# Patient Record
Sex: MALE | Race: WHITE
[De-identification: names, ages, dates, MRNs, and addresses within clinical notes are randomized per-mention and may not be internally consistent; named-entity substitution may affect disease eponyms.]

---

## 2019-12-19 ENCOUNTER — HOSPITAL ENCOUNTER (EMERGENCY)
Dept: HOSPITAL 76 - ED | Age: 84
Discharge: HOME | End: 2019-12-19
Payer: MEDICARE

## 2019-12-19 VITALS — DIASTOLIC BLOOD PRESSURE: 67 MMHG | SYSTOLIC BLOOD PRESSURE: 146 MMHG

## 2019-12-19 DIAGNOSIS — Z85.46: ICD-10-CM

## 2019-12-19 DIAGNOSIS — N30.90: Primary | ICD-10-CM

## 2019-12-19 LAB
CLARITY UR REFRACT.AUTO: (no result)
GLUCOSE UR QL STRIP.AUTO: NEGATIVE MG/DL
KETONES UR QL STRIP.AUTO: (no result) MG/DL
NITRITE UR QL STRIP.AUTO: NEGATIVE
PH UR STRIP.AUTO: 6 PH (ref 5–7.5)
PROT UR STRIP.AUTO-MCNC: 100 MG/DL
RBC # UR STRIP.AUTO: (no result) /UL
RBC # URNS HPF: (no result) /HPF (ref 0–5)
SP GR UR STRIP.AUTO: 1.02 (ref 1–1.03)
SQUAMOUS URNS QL MICRO: (no result)
UROBILINOGEN UR QL STRIP.AUTO: (no result) E.U./DL
UROBILINOGEN UR STRIP.AUTO-MCNC: NEGATIVE MG/DL

## 2019-12-19 PROCEDURE — 87077 CULTURE AEROBIC IDENTIFY: CPT

## 2019-12-19 PROCEDURE — 81003 URINALYSIS AUTO W/O SCOPE: CPT

## 2019-12-19 PROCEDURE — 81001 URINALYSIS AUTO W/SCOPE: CPT

## 2019-12-19 PROCEDURE — 87086 URINE CULTURE/COLONY COUNT: CPT

## 2019-12-19 PROCEDURE — 87181 SC STD AGAR DILUTION PER AGT: CPT

## 2019-12-19 PROCEDURE — 99283 EMERGENCY DEPT VISIT LOW MDM: CPT

## 2019-12-19 NOTE — ED PHYSICIAN DOCUMENTATION
PD HPI NVD





- Stated complaint


Stated Complaint: FEVER





- Chief complaint


Chief Complaint: Fever





- History obtained from


History obtained from: Patient (This is a relatively healthy 88-year-old 

gentleman with remote history of prostate cancer, he had some sort of bladder 

abnormality that was assessed by cystoscopy on October 29 and it was found that 

he had probably bladder cancer around an old radiation seed from his prostate 

cancer.  He has a repeat cystoscopy scheduled for next month, but for the last 3

to 4 weeks he has been having some intermittent low back pain along with 

purulent-looking urine and urinary incontinence and chills and weakness.)





Review of Systems


Constitutional: reports: Chills.  denies: Fever


Nose: denies: Rhinorrhea / runny nose, Congestion


Cardiac: denies: Chest pain / pressure, Palpitations


Respiratory: denies: Dyspnea, Cough





PD PAST MEDICAL HISTORY





- Past Medical History


GI: GERD


: Other





- Past Surgical History


Past Surgical History: Yes





- Present Medications


Home Medications: 


                                Ambulatory Orders











 Medication  Instructions  Recorded  Confirmed


 


Hydrochlorothiazide 1 tab PO DAILY 03/04/18 


 


Omeprazole 1 cap PO DAILY 03/04/18 


 


Ciprofloxacin HCl [Cipro] 500 mg PO BID #20 tablet 12/19/19 














- Allergies


Allergies/Adverse Reactions: 


                                    Allergies











Allergy/AdvReac Type Severity Reaction Status Date / Time


 


No Known Drug Allergies Allergy   Verified 12/19/19 13:29














- Social History


Does the pt smoke?: No


Smoking Status: Never smoker


Does the pt drink ETOH?: No


Does the pt have substance abuse?: No





- Immunizations


Immunizations are current?: Yes





- POLST


Patient has POLST: No





PD ED PE NORMAL





- Vitals


Vital signs reviewed: Yes





- General


General: Alert and oriented X 3, No acute distress





- Abdomen


Abdomen: Normal bowel sounds, Soft, Non tender





- Back


Back: No CVA TTP, No spinal TTP





- Neuro


Neuro: Alert and oriented X 3, Normal speech





Results





- Vitals


Vitals: 


                               Vital Signs - 24 hr











  12/19/19





  13:29


 


Temperature 37.1 C


 


Heart Rate 73


 


Respiratory 16





Rate 


 


Blood Pressure 136/68 H


 


O2 Saturation 97








                                     Oxygen











O2 Source                      Room air

















- Labs


Labs: 


                                Laboratory Tests











  12/19/19





  13:43


 


Urine Color  YELLOW


 


Urine Clarity  SL. CLOUDY


 


Urine pH  6.0


 


Ur Specific Gravity  1.025


 


Urine Protein  100 H


 


Urine Glucose (UA)  NEGATIVE


 


Urine Ketones  TRACE


 


Urine Occult Blood  SMALL H


 


Urine Nitrite  NEGATIVE


 


Urine Bilirubin  NEGATIVE


 


Urine Urobilinogen  1 (NORMAL)


 


Ur Leukocyte Esterase  MODERATE H


 


Urine RBC  0-5


 


Urine WBC  >25 H


 


Ur Squamous Epith Cells  RARE Squamous


 


Urine Bacteria  Moderate H


 


Ur Microscopic Review  INDICATED


 


Urine Culture Comments  INDICATED














PD MEDICAL DECISION MAKING





- ED course


ED course: 





88-year-old gentleman presents with fairly typical symptoms for a bladder 

infection and found to have same treated with Cipro.





Departure





- Departure


Disposition: 01 Home, Self Care


Clinical Impression: 


 Cystitis





Condition: Good


Record reviewed to determine appropriate education?: Yes


Instructions:  ED UTI Cystitis Male


Prescriptions: 


Ciprofloxacin HCl [Cipro] 500 mg PO BID #20 tablet


Comments: 


Follow-up with urologist as scheduled.  Return for new or worsening symptoms.





We will culture your urine, the results should be done in 48-72 hours.  If an 

antibiotic change is necessary we will call you.  Return if worse in the 

meantime, especially if you develop increasing flank pain, fevers, or cannot 

keep down the medication.

## 2019-12-25 ENCOUNTER — HOSPITAL ENCOUNTER (INPATIENT)
Dept: HOSPITAL 76 - ED | Age: 84
LOS: 8 days | Discharge: ANOTHER HOSPITAL | DRG: 683 | End: 2020-01-02
Attending: INTERNAL MEDICINE | Admitting: INTERNAL MEDICINE
Payer: MEDICARE

## 2019-12-25 DIAGNOSIS — M79.89: ICD-10-CM

## 2019-12-25 DIAGNOSIS — C67.9: ICD-10-CM

## 2019-12-25 DIAGNOSIS — R59.0: ICD-10-CM

## 2019-12-25 DIAGNOSIS — K76.1: ICD-10-CM

## 2019-12-25 DIAGNOSIS — B96.89: ICD-10-CM

## 2019-12-25 DIAGNOSIS — D12.2: ICD-10-CM

## 2019-12-25 DIAGNOSIS — R09.02: ICD-10-CM

## 2019-12-25 DIAGNOSIS — N39.0: ICD-10-CM

## 2019-12-25 DIAGNOSIS — R50.9: ICD-10-CM

## 2019-12-25 DIAGNOSIS — E87.70: ICD-10-CM

## 2019-12-25 DIAGNOSIS — R09.89: ICD-10-CM

## 2019-12-25 DIAGNOSIS — Z79.1: ICD-10-CM

## 2019-12-25 DIAGNOSIS — K21.9: ICD-10-CM

## 2019-12-25 DIAGNOSIS — R59.1: ICD-10-CM

## 2019-12-25 DIAGNOSIS — D64.9: Primary | ICD-10-CM

## 2019-12-25 DIAGNOSIS — R79.89: ICD-10-CM

## 2019-12-25 DIAGNOSIS — R39.11: ICD-10-CM

## 2019-12-25 DIAGNOSIS — R79.1: ICD-10-CM

## 2019-12-25 DIAGNOSIS — N17.9: ICD-10-CM

## 2019-12-25 DIAGNOSIS — E86.0: ICD-10-CM

## 2019-12-25 DIAGNOSIS — Z72.89: ICD-10-CM

## 2019-12-25 DIAGNOSIS — R19.5: ICD-10-CM

## 2019-12-25 DIAGNOSIS — K76.0: ICD-10-CM

## 2019-12-25 DIAGNOSIS — E87.6: ICD-10-CM

## 2019-12-25 DIAGNOSIS — Z85.46: ICD-10-CM

## 2019-12-25 DIAGNOSIS — K92.1: ICD-10-CM

## 2019-12-25 DIAGNOSIS — Z92.3: ICD-10-CM

## 2019-12-25 DIAGNOSIS — D12.3: ICD-10-CM

## 2019-12-25 DIAGNOSIS — K31.9: ICD-10-CM

## 2019-12-25 DIAGNOSIS — I10: ICD-10-CM

## 2019-12-25 DIAGNOSIS — D50.0: ICD-10-CM

## 2019-12-25 LAB
ALBUMIN DIAFP-MCNC: 2.2 G/DL (ref 3.2–5.5)
ALBUMIN/GLOB SERPL: 0.6 {RATIO} (ref 1–2.2)
ALP SERPL-CCNC: 71 IU/L (ref 42–121)
ALT SERPL W P-5'-P-CCNC: 41 IU/L (ref 10–60)
ANION GAP SERPL CALCULATED.4IONS-SCNC: 10 MMOL/L (ref 6–13)
AST SERPL W P-5'-P-CCNC: 52 IU/L (ref 10–42)
BASOPHILS NFR BLD AUTO: 0 10^3/UL (ref 0–0.1)
BASOPHILS NFR BLD AUTO: 0.4 %
BILIRUB BLD-MCNC: 0.4 MG/DL (ref 0.2–1)
BILIRUB UR QL CFM: POSITIVE
BUN SERPL-MCNC: 30 MG/DL (ref 6–20)
CALCIUM UR-MCNC: 8.4 MG/DL (ref 8.5–10.3)
CASTS URNS QL MICRO: (no result) /LPF
CHLORIDE SERPL-SCNC: 99 MMOL/L (ref 101–111)
CLARITY UR REFRACT.AUTO: (no result)
CO2 SERPL-SCNC: 29 MMOL/L (ref 21–32)
CREAT SERPLBLD-SCNC: 1.4 MG/DL (ref 0.6–1.2)
EOSINOPHIL # BLD AUTO: 0 10^3/UL (ref 0–0.7)
EOSINOPHIL NFR BLD AUTO: 0.1 %
ERYTHROCYTE [DISTWIDTH] IN BLOOD BY AUTOMATED COUNT: 12.5 % (ref 12–15)
GFRSERPLBLD MDRD-ARVRAT: 48 ML/MIN/{1.73_M2} (ref 89–?)
GLOBULIN SER-MCNC: 4 G/DL (ref 2.1–4.2)
GLUCOSE SERPL-MCNC: 134 MG/DL (ref 70–100)
GLUCOSE UR QL STRIP.AUTO: NEGATIVE MG/DL
HGB UR QL STRIP: 7.7 G/DL (ref 14–18)
KETONES UR QL STRIP.AUTO: NEGATIVE MG/DL
LIPASE SERPL-CCNC: 35 U/L (ref 22–51)
LYMPHOCYTES # SPEC AUTO: 0.7 10^3/UL (ref 1.5–3.5)
LYMPHOCYTES NFR BLD AUTO: 8.9 %
MCH RBC QN AUTO: 31.2 PG (ref 27–31)
MCHC RBC AUTO-ENTMCNC: 31.4 G/DL (ref 32–36)
MCV RBC AUTO: 99.2 FL (ref 80–94)
MONOCYTES # BLD AUTO: 0.7 10^3/UL (ref 0–1)
MONOCYTES NFR BLD AUTO: 9.4 %
NEUTROPHILS # BLD AUTO: 6 10^3/UL (ref 1.5–6.6)
NEUTROPHILS # SNV AUTO: 7.4 X10^3/UL (ref 4.8–10.8)
NEUTROPHILS NFR BLD AUTO: 80.8 %
NITRITE UR QL STRIP.AUTO: NEGATIVE
PDW BLD AUTO: 10.3 FL (ref 7.4–11.4)
PH UR STRIP.AUTO: 5 PH (ref 5–7.5)
PLATELET # BLD: 254 10^3/UL (ref 130–450)
PROT SPEC-MCNC: 6.2 G/DL (ref 6.7–8.2)
PROT UR STRIP.AUTO-MCNC: 100 MG/DL
RBC # UR STRIP.AUTO: (no result) /UL
RBC # URNS HPF: (no result) /HPF (ref 0–5)
RBC MAR: 2.47 10^6/UL (ref 4.7–6.1)
SODIUM SERPLBLD-SCNC: 138 MMOL/L (ref 135–145)
SP GR UR STRIP.AUTO: >=1.03 (ref 1–1.03)
SQUAMOUS URNS QL MICRO: (no result)
UROBILINOGEN UR QL STRIP.AUTO: (no result) E.U./DL
UROBILINOGEN UR STRIP.AUTO-MCNC: (no result) MG/DL

## 2019-12-25 PROCEDURE — 83605 ASSAY OF LACTIC ACID: CPT

## 2019-12-25 PROCEDURE — 93971 EXTREMITY STUDY: CPT

## 2019-12-25 PROCEDURE — 83880 ASSAY OF NATRIURETIC PEPTIDE: CPT

## 2019-12-25 PROCEDURE — 81003 URINALYSIS AUTO W/O SCOPE: CPT

## 2019-12-25 PROCEDURE — 80048 BASIC METABOLIC PNL TOTAL CA: CPT

## 2019-12-25 PROCEDURE — 85610 PROTHROMBIN TIME: CPT

## 2019-12-25 PROCEDURE — 97161 PT EVAL LOW COMPLEX 20 MIN: CPT

## 2019-12-25 PROCEDURE — 84466 ASSAY OF TRANSFERRIN: CPT

## 2019-12-25 PROCEDURE — 83690 ASSAY OF LIPASE: CPT

## 2019-12-25 PROCEDURE — 99284 EMERGENCY DEPT VISIT MOD MDM: CPT

## 2019-12-25 PROCEDURE — 82272 OCCULT BLD FECES 1-3 TESTS: CPT

## 2019-12-25 PROCEDURE — 80076 HEPATIC FUNCTION PANEL: CPT

## 2019-12-25 PROCEDURE — 83735 ASSAY OF MAGNESIUM: CPT

## 2019-12-25 PROCEDURE — 76770 US EXAM ABDO BACK WALL COMP: CPT

## 2019-12-25 PROCEDURE — 71046 X-RAY EXAM CHEST 2 VIEWS: CPT

## 2019-12-25 PROCEDURE — 99285 EMERGENCY DEPT VISIT HI MDM: CPT

## 2019-12-25 PROCEDURE — 82607 VITAMIN B-12: CPT

## 2019-12-25 PROCEDURE — 85018 HEMOGLOBIN: CPT

## 2019-12-25 PROCEDURE — 86920 COMPATIBILITY TEST SPIN: CPT

## 2019-12-25 PROCEDURE — 97166 OT EVAL MOD COMPLEX 45 MIN: CPT

## 2019-12-25 PROCEDURE — 97530 THERAPEUTIC ACTIVITIES: CPT

## 2019-12-25 PROCEDURE — 36415 COLL VENOUS BLD VENIPUNCTURE: CPT

## 2019-12-25 PROCEDURE — 71045 X-RAY EXAM CHEST 1 VIEW: CPT

## 2019-12-25 PROCEDURE — 96365 THER/PROPH/DIAG IV INF INIT: CPT

## 2019-12-25 PROCEDURE — 84132 ASSAY OF SERUM POTASSIUM: CPT

## 2019-12-25 PROCEDURE — 30233N1 TRANSFUSION OF NONAUTOLOGOUS RED BLOOD CELLS INTO PERIPHERAL VEIN, PERCUTANEOUS APPROACH: ICD-10-PCS | Performed by: INTERNAL MEDICINE

## 2019-12-25 PROCEDURE — 86901 BLOOD TYPING SEROLOGIC RH(D): CPT

## 2019-12-25 PROCEDURE — 87040 BLOOD CULTURE FOR BACTERIA: CPT

## 2019-12-25 PROCEDURE — 86900 BLOOD TYPING SEROLOGIC ABO: CPT

## 2019-12-25 PROCEDURE — 83540 ASSAY OF IRON: CPT

## 2019-12-25 PROCEDURE — 80053 COMPREHEN METABOLIC PANEL: CPT

## 2019-12-25 PROCEDURE — 51798 US URINE CAPACITY MEASURE: CPT

## 2019-12-25 PROCEDURE — 85025 COMPLETE CBC W/AUTO DIFF WBC: CPT

## 2019-12-25 PROCEDURE — 87276 INFLUENZA A AG IF: CPT

## 2019-12-25 PROCEDURE — 86850 RBC ANTIBODY SCREEN: CPT

## 2019-12-25 PROCEDURE — 82746 ASSAY OF FOLIC ACID SERUM: CPT

## 2019-12-25 PROCEDURE — 87275 INFLUENZA B AG IF: CPT

## 2019-12-25 PROCEDURE — 81001 URINALYSIS AUTO W/SCOPE: CPT

## 2019-12-25 PROCEDURE — 85014 HEMATOCRIT: CPT

## 2019-12-25 PROCEDURE — 87086 URINE CULTURE/COLONY COUNT: CPT

## 2019-12-25 PROCEDURE — 74177 CT ABD & PELVIS W/CONTRAST: CPT

## 2019-12-25 RX ADMIN — SODIUM CHLORIDE SCH MLS/HR: 9 INJECTION, SOLUTION INTRAVENOUS at 18:42

## 2019-12-25 RX ADMIN — SODIUM CHLORIDE, PRESERVATIVE FREE PRN ML: 5 INJECTION INTRAVENOUS at 18:48

## 2019-12-25 NOTE — HISTORY & PHYSICAL EXAMINATION
Chief Complaint





- Chief Complaint


Chief Complaint: not feeling well





History of Present Illness





- Admitted From


Admitted From:: [facility]





- History Obtained From


Records Reviewed: yes


History obtained from: patient and family





- History of Present Illness


HPI Comment/Other: 


Patient is an 87 y/o male who presented to the ED with complain of not feeling 

well for the past 3-4 weeks. He was initially seen in the ED in the beginning of

November for removal of a cruz catheter. He returned 1 week ago because he was 

still not feeling well. He was found to have a UTI and given a 5-day course of 

Cipro. He was also advised to return if there was no improvement thus leading to

his return. On further review it was found that 2 bacteria grew from the urine 

culture E. faecalis and Aerococcus urinae. Aerococcus was not sensitive to 

Cipro. He was also found to have a hemoglobin of 7.7. On October 29th 2019, it 

was 12.6. He denies dark tarry stools, coffee ground emesis or any traumatic 

event from which he would have bled profusely. He last had a colonoscopy 8-9 yrs

ago, during which a polyp was found. He denied chest pain, dyspnea, abd pain, 

n/v/d. He reports back spasm.


He has been diagnosed with bladder cancer and is schedule to follow up with [facility] for further treatment.








History





- Past Medical History


Cardiovascular: reports: Hypertension


Neuro: reports: None


GI: reports: GERD


: reports: Other (Prostate cancer, Bladder cancer)


Musculoskeletal: reports: Chronic back pain, Other


MRSA Hx?: No


Other Past Medical History: bladder ca





- Past Surgical History


General: reports: Colonoscopy, Other


Ortho: reports: Arthroscopic surgery, Other





- Family & Social History


Family History: Father: Cancer (lung and brain)


Living arrangement: At home


Social History Notes: He does not smoke or use illicit substances.  He has 1-2 

drinks daily





- POLST


Patient has POLST: No


POLST Status: Full Code





Meds/Allgy





- Home Medications


Home Medications: 


                                Ambulatory Orders











 Medication  Instructions  Recorded  Confirmed


 


Hydrochlorothiazide 1 tab PO DAILY 03/04/18 


 


Omeprazole 1 cap PO DAILY 03/04/18 


 


Ciprofloxacin HCl [Cipro] 500 mg PO BID #20 tablet 12/19/19 


 


Cefdinir 300 mg PO BID #20 capsule 12/25/19 














- Allergies


Allergies/Adverse Reactions: 


                                    Allergies











Allergy/AdvReac Type Severity Reaction Status Date / Time


 


No Known Drug Allergies Allergy   Verified 12/19/19 13:29














Review of Systems





- Constitutional


Constitutional: reports: Fatigue, Malaise.  denies: Fever, Chills





- Eyes


Eyes: denies: Pain, Blurred vision, Dipolpia





- Ears, Nose & Throat


Ears, Nose & Throat: denies: Vertigo, Sore throat





- Cardiovascular


Cariovascular: denies: Irregular heart rate, Palpitations, Chest pain, 

Lightheadedness, Syncope, Exertional dyspnea





- Respiratory


Respiratory: denies: Cough, Wheezing, SOB at rest, SOB with exertion





- Gastrointestinal


Gastrointestinal: reports: Reflux/heartburn.  denies: Abdominal pain, Abdominal 

distention, Constipation, Diarrhea, Black stools, Bloody stools, Nausea, 

Vomiting, Coffee grounds emesis





- Genitourinary


Genitourinary: denies: Dysuria, Frequency, Urgency, Hematuria





- Musculoskeletal


Musculoskeletal: reports: Back pain





- Integumentary


Integumentary: denies: Rash, Pruritis, Lesions





- Neurological


Neurological: denies: General weakness, Focal weakness, Headache





- Psychiatric


Psychiatric: denies: Depression, Anxiety





- Endocrine


Endocrine: denies: Polyuria, Polydypsia





- Hematologic/Lymphatic


Hematologic/Lymphatic: reports: Anemia.  denies: Bruising, Petechiae


Prior Level of Functionality: 


Patient is independent of activities of daily living








Exam





- Vital Signs


Vital Signs: 





                                Vital Signs x48h











  Temp Pulse Pulse Resp BP BP Pulse Ox


 


 12/25/19 18:04  37.3 C   81    154/62 H  90 L


 


 12/25/19 16:16  36.4 C L  66   14  130/74   97


 


 12/25/19 13:00   50 L   16  106/60   96














- Physical Exam


General Appearance: positive: No acute distress, Alert


Eyes Bilateral: positive: Normal inspection, PERRL, EOMI


ENT: positive: ENT inspection nml


Neck: positive: Nml inspection, No JVD, Trachea midline


Respiratory: positive: Chest non-tender, No respiratory distress, Breath sounds 

nml.  negative: Wheezes, Rales, Rhonchi


Cardiovascular: positive: Regular rate & rhythm, No murmur


Abdomen: positive: Non-tender, No organomegaly, Nml bowel sounds, No distention.

  negative: Guarding, Rebound


Rectal: positive: Stool - heme POS


Back: positive: Nml inspection


Skin: positive: Color nml, No rash, Warm, Dry


Extremities: positive: Non-tender, Full ROM, Nml appearance


Neurologic/Psychiatric: positive: Oriented x3, CN's nml (2-12), Mood/affect nml





Conclusion/Plan





- Problem List


(1) UTI (urinary tract infection)


Conclusion/Plan: 


Urine culture grew E. Faecalis and Aerococcus urinae. 


Patient started on rocephin. Will continue


IV hydration with normal saline


Qualifiers: 


   Urinary tract infection type: site unspecified   Hematuria presence: without 

hematuria   Qualified Code(s): N39.0 - Urinary tract infection, site not 

specified   





(2) Anemia


Conclusion/Plan: 


Likely 2/2 GI bleed.


Patient was guaic positive


However MCV indicates macrocytic


Will consult Gen Surgery for possibly EGD and colonoscopy


Last colonoscopy was 8-9 yrs ago. Benign polyp noted


Iron studies, Vit B12 and folate


Protonix daily








(3) GI bleed


Conclusion/Plan: 


Gen Surg Consult for endoscopy


Monitor hemoglobin.


Protonix daily








(4) Hypertension


Conclusion/Plan: 


Will order hydralazine prn








(5) Hypokalemia


Conclusion/Plan: 


? 2/2 HCTZ.


Will hold.


Correct potassium. Recheck in the am.


Also check Mg in the am








(6) NIXON (acute kidney injury)


Conclusion/Plan: 


?2/2 UTI


On rocephin.


Receiving IV hydration.


Expecting improvement. Will monitor.








(7) Bladder cancer


Conclusion/Plan: 


Patient to follow up with as planned


[facility] for treatment.








- Lab Results


Fish Bones: 


                                 12/25/19 11:53





                                 12/25/19 11:53





Core Measures





- Anticipated LOS


I expect patient to be DC'd or transferred within 96 hours.: Yes





- DVT/VTE - Prophylaxis


VTE/DVT Device ordered at admit?: Yes

## 2019-12-25 NOTE — ED PHYSICIAN DOCUMENTATION
History of Present Illness





- Stated complaint


Stated Complaint: FEVER





- Chief complaint


Chief Complaint: Fever





- Additonal information


Additional information: 





This is an 88-year-old male who presents with fever, fatigue, urinary symptoms. 

Pt has a history of prostate cancer status post radiation seed brachytherapy, 

with what sounds like potential bladder or urethral cancer which is thought to 

be left over from the prostate cancer. This was recently resected at [facility] on 10/29.  Since his surgery on 10/29 he has had some dribbling of his 

urine and he also developed in the last several weeks intermittent fever and 

some suprapubic mild discomfort.  He was noted by his Family to be somewhat 

"foggy headed" or less sharp than usual. He was brought here and had a urine 

test done which showed elevated white blood cell count in the urine on 12/19 he 

was treated with ciprofloxacin but his symptoms did not improve. He continues to

have some intermittent mild Fever up to 100.5 F. He also does have some urinary

hesitancy, as well as dribbling. His right leg has had painless edema without 

erythema for last 2 days.  He has no history of blood clots.  No chest pain, no 

shortness of breath. No blood in his stool.





From his description he actually had a radiation seed in his prostate that ended

up trans-locating into his urethra or bladder, and this formed some type of mass

which was then resected by his urologist and confirmed to be cancerous. He 

states that it was more in his urethra were worried it was going to spread to 

his bladder.








Review of Systems


Constitutional: reports: Fever


Cardiac: denies: Chest pain / pressure


Respiratory: denies: Dyspnea


GI: reports: Other (Reduced appetite)


: reports: Dysuria, Frequency, Hesitancy


Skin: denies: Rash


Neurologic: reports: Generalized weakness





PD PAST MEDICAL HISTORY





- Past Medical History


Past Medical History: Yes


GI: GERD


: Other


Other Past Medical History: bladder ca





- Past Surgical History


Past Surgical History: Yes





- Present Medications


Home Medications: 


                                Ambulatory Orders











 Medication  Instructions  Recorded  Confirmed


 


Hydrochlorothiazide 1 tab PO DAILY 03/04/18 


 


Omeprazole 1 cap PO DAILY 03/04/18 


 


Ciprofloxacin HCl [Cipro] 500 mg PO BID #20 tablet 12/19/19 


 


Cefdinir 300 mg PO BID #20 capsule 12/25/19 














- Allergies


Allergies/Adverse Reactions: 


                                    Allergies











Allergy/AdvReac Type Severity Reaction Status Date / Time


 


No Known Drug Allergies Allergy   Verified 12/19/19 13:29














- Social History


Does the pt smoke?: No


Smoking Status: Never smoker


Does the pt drink ETOH?: No


Does the pt have substance abuse?: No





- Immunizations


Immunizations are current?: Yes





- POLST


Patient has POLST: No





PD ED PE NORMAL





- Vitals


Vital signs reviewed: Yes





- General


General: Alert and oriented X 3, No acute distress





- HEENT


HEENT: PERRL





- Neck


Neck: Supple, no meningeal sign





- Cardiac


Cardiac: RRR, No murmur





- Respiratory


Respiratory: No respiratory distress, Clear bilaterally





- Abdomen


Abdomen: Soft, Non tender, Non distended





- Derm


Derm: Warm and dry





- Extremities


Extremities: No deformity





- Neuro


Neuro: Alert and oriented X 3, CNs 2-12 intact, No motor deficit, No sensory 

deficit, Normal speech





- Psych


Psych: Normal mood, Normal affect





Results





- Vitals


Vitals: 


                               Vital Signs - 24 hr











  12/25/19 12/25/19 12/25/19





  10:27 13:00 16:16


 


Temperature 36.8 C  36.4 C L


 


Heart Rate 93 50 L 66


 


Respiratory 20 16 14





Rate   


 


Blood Pressure 109/50 L 106/60 130/74


 


O2 Saturation 97 96 97








                                     Oxygen











O2 Source                      Room air

















- Labs


Labs: 


                                  Microbiology











 12/25/19 15:54 Occult Blood - Final





 Stool 








                                Laboratory Tests











  12/25/19 12/25/19 12/25/19





  11:53 11:53 11:53


 


WBC  7.4  


 


RBC  2.47 L  


 


Hgb  7.7 L  


 


Hct  24.5 L  


 


MCV  99.2 H  


 


MCH  31.2 H  


 


MCHC  31.4 L  


 


RDW  12.5  


 


Plt Count  254  


 


MPV  10.3  


 


Neut # (Auto)  6.0  


 


Lymph # (Auto)  0.7 L  


 


Mono # (Auto)  0.7  


 


Eos # (Auto)  0.0  


 


Baso # (Auto)  0.0  


 


Absolute Nucleated RBC  0.00  


 


Nucleated RBC %  0.0  


 


Sodium   138 


 


Potassium   3.0 L 


 


Chloride   99 L 


 


Carbon Dioxide   29 


 


Anion Gap   10.0 


 


BUN   30 H 


 


Creatinine   1.4 H 


 


Estimated GFR (MDRD)   48 L 


 


Glucose   134 H 


 


Lactic Acid    1.4


 


Calcium   8.4 L 


 


Total Bilirubin   0.4 


 


AST   52 H 


 


ALT   41 


 


Alkaline Phosphatase   71 


 


Total Protein   6.2 L 


 


Albumin   2.2 L 


 


Globulin   4.0 


 


Albumin/Globulin Ratio   0.6 L 


 


Lipase   35 


 


Urine Color   


 


Urine Clarity   


 


Urine pH   


 


Ur Specific Gravity   


 


Urine Protein   


 


Urine Glucose (UA)   


 


Urine Ketones   


 


Urine Occult Blood   


 


Urine Nitrite   


 


Urine Bilirubin   


 


Urine Urobilinogen   


 


Ur Leukocyte Esterase   


 


Urine RBC   


 


Urine WBC   


 


Ur Squamous Epith Cells   


 


Amorphous Sediment   


 


Urine Bacteria   


 


Urine Casts   


 


Ur Microscopic Review   


 


Urine Culture Comments   














  12/25/19





  14:28


 


WBC 


 


RBC 


 


Hgb 


 


Hct 


 


MCV 


 


MCH 


 


MCHC 


 


RDW 


 


Plt Count 


 


MPV 


 


Neut # (Auto) 


 


Lymph # (Auto) 


 


Mono # (Auto) 


 


Eos # (Auto) 


 


Baso # (Auto) 


 


Absolute Nucleated RBC 


 


Nucleated RBC % 


 


Sodium 


 


Potassium 


 


Chloride 


 


Carbon Dioxide 


 


Anion Gap 


 


BUN 


 


Creatinine 


 


Estimated GFR (MDRD) 


 


Glucose 


 


Lactic Acid 


 


Calcium 


 


Total Bilirubin 


 


AST 


 


ALT 


 


Alkaline Phosphatase 


 


Total Protein 


 


Albumin 


 


Globulin 


 


Albumin/Globulin Ratio 


 


Lipase 


 


Urine Color  YELLOW


 


Urine Clarity  CLOUDY


 


Urine pH  5.0


 


Ur Specific Gravity  >=1.030 H


 


Urine Protein  100 H


 


Urine Glucose (UA)  NEGATIVE


 


Urine Ketones  NEGATIVE


 


Urine Occult Blood  SMALL H


 


Urine Nitrite  NEGATIVE


 


Urine Bilirubin  SMALL H


 


Urine Urobilinogen  1 (NORMAL)


 


Ur Leukocyte Esterase  SMALL H


 


Urine RBC  0-5


 


Urine WBC  >25 H


 


Ur Squamous Epith Cells  RARE Squamous


 


Amorphous Sediment  Few


 


Urine Bacteria  Few


 


Urine Casts  0-2 Hyaline Casts


 


Ur Microscopic Review  INDICATED


 


Urine Culture Comments  INDICATED














- Rads (name of study)


  ** Duplex US


Radiology: Other (No evidence for DVT in the right lower extremity, there is a 

2.7 cm hypervascular lymph node in the right groin, and there is an avascular 

anechoic fluid collection at the anterior aspect of the knee of unclear 

etiology.)





PD MEDICAL DECISION MAKING





- ED course


Complexity details: considered differential (UTI, bacteremia, electrolyte 

abnormality, dehydration anemia)


ED course: 





On arrival patient is nontoxic-appearing, vital signs are unremarkable, his 

diastolic blood pressure is a little low at 50, but he is not feeling 

lightheaded and his blood pressure stable on multiple readings.  His heart rate 

is also within normal limits.  He had labs drawn, his chemistry panel reveals a 

mild hypokalemia which was repleted orally, and a creatinine of 1.4 which is 

stable from his last visit.  His lactate is normal.  He has a hemoglobin of 7.7,

 and in our most recent labs from 2018 his baseline appears to be 12.6.  He has 

not been told that he has a anemia in the past to his knowledge.  It sound like 

he had minimal bleeding associated with his surgery.  He denies any dark stools 

or bright red blood in his stool, and he has not had any significant hematuria. 

 Rectal exam reveals positive occult blood. Records from [facility] were

 requested to obtain information about his surgery as well as his most recent 

set of blood counts.





Urine shows greater than 25 white blood cells, reviewing the microbiology he has

 an aerobacter and enterobacter infection, the Aerobacter bacteria was resistant

 to fluoroquinolones, which is probably why he has not been responding to 

ciprofloxacin Despite being on it for 5 days.  He was given a gram of 

ceftriaxone here.





We did check for urinary obstruction with a bladder scan as well as a bedside 

ultrasound, and the bladder appeared decompressed.





Regarding his right leg swelling, there are no signs of DVT on our study today, 

he did have an enlarged lymph node I reviewed this with him explained this could

 be secondary to infection, though given his history of the cancer It warrants 

surveillance and potentially further work-up if it persists. I also discussed 

that he needs a repeat ultrasound in 2 weeks if his leg swelling is not 

improving.  He has an anterior knee fluid collection but he has no erythema or 

fluctuance in this area, no signs of abscess or infection, this may be a cystic 

structure but I doubt is the cause of his swelling.





Patient has no chest pain, shortness of breath, or symptoms of cardiopulmonary 

problems. Given the fact that he has a low hemoglobin with blood present in his 

stool, as well as the fact that he has a urinary tract infection which is now 

responded to oral therapy, he was admitted to the hospital.  He did have Blood 

cultures drawn prior to the administration of antibiotics.  I reviewed patient's

 labs with him, and did review that he has some results that he will need to 

follow-up with his PCP on when he is discharge from the hospital. Patient 

verbalizes understanding.  His vital signs remained stable at the time of 

admission





Departure





- Departure


Disposition: [facility] DC/Xfer


Clinical Impression: 


 Occult blood in stools, Low hemoglobin





UTI (urinary tract infection)


Qualifiers:


 Urinary tract infection type: site unspecified Hematuria presence: without 

hematuria Qualified Code(s): N39.0 - Urinary tract infection, site not specified





Condition: Good

## 2019-12-25 NOTE — ULTRASOUND REPORT
Reason:  RLE edema

Procedure Date:  12/25/2019   

Accession Number:  [id number] / [id number]                    

Procedure:  US  - Duplex Ext Veins Right CPT Code:  

 

***Final Report***

 

 

FULL RESULT:

 

 

EXAM:

RIGHT LOWER EXTREMITY VENOUS ULTRASOUND

 

EXAM DATE: 12/25/2019 03:06 PM.

 

CLINICAL HISTORY: RLE edema.

 

COMPARISON: None.

 

TECHNIQUE: Real-time sonographic vascular imaging was performed by the 

sonographer through the lower extremity utilizing both color-flow and 

Doppler spectral analysis. Multiple representative static images were 

saved for review.

 

FINDINGS:

Common Femoral Vein (CFV): Normal.

CFV-GSV Junction: Normal.

Profunda Femoral Vein (PFV): Normal.

Femoral Vein (FV) Prox: Normal.

Femoral Vein (FV) Mid: Normal.

Femoral Vein (FV) Dist: Normal.

Popliteal Vein: Normal.

Posterior Tibial Veins: Normal.

Peroneal Veins: Normal.

Contralateral Side CFV: Normal.

 

Other: Enlarged lymph node in the groin measuring 2 x 2.7 cm is seen with 

increased internal vascularity. Also noted is a 3 x 7 x 1.8 x 0.7 cm 

fluid collection at the anterior aspect of the right knee.

IMPRESSION:

1. No evidence for right lower extremity DVT.

2. 2.7 cm enlarged hypervascular lymph node in the right groin a 

suspicious given history of bladder carcinoma.

3. Avascular anechoic fluid collection at the anterior aspect of the knee 

measuring up to 3.7 cm of unclear etiology.

 

[facility]

## 2019-12-26 LAB
ALBUMIN DIAFP-MCNC: 2.2 G/DL (ref 3.2–5.5)
ALBUMIN/GLOB SERPL: 0.6 {RATIO} (ref 1–2.2)
ALP SERPL-CCNC: 73 IU/L (ref 42–121)
ALT SERPL W P-5'-P-CCNC: 40 IU/L (ref 10–60)
ANION GAP SERPL CALCULATED.4IONS-SCNC: 10 MMOL/L (ref 6–13)
AST SERPL W P-5'-P-CCNC: 45 IU/L (ref 10–42)
BASOPHILS NFR BLD AUTO: 0 10^3/UL (ref 0–0.1)
BASOPHILS NFR BLD AUTO: 0.3 %
BILIRUB BLD-MCNC: 0.5 MG/DL (ref 0.2–1)
BUN SERPL-MCNC: 27 MG/DL (ref 6–20)
CALCIUM UR-MCNC: 8 MG/DL (ref 8.5–10.3)
CHLORIDE SERPL-SCNC: 96 MMOL/L (ref 101–111)
CO2 SERPL-SCNC: 29 MMOL/L (ref 21–32)
CREAT SERPLBLD-SCNC: 1.2 MG/DL (ref 0.6–1.2)
EOSINOPHIL # BLD AUTO: 0 10^3/UL (ref 0–0.7)
EOSINOPHIL NFR BLD AUTO: 0.1 %
ERYTHROCYTE [DISTWIDTH] IN BLOOD BY AUTOMATED COUNT: 12.6 % (ref 12–15)
FOLATE SERPL-MCNC: 12.69 NG/ML
GFRSERPLBLD MDRD-ARVRAT: 57 ML/MIN/{1.73_M2} (ref 89–?)
GLOBULIN SER-MCNC: 4 G/DL (ref 2.1–4.2)
GLUCOSE SERPL-MCNC: 120 MG/DL (ref 70–100)
HGB UR QL STRIP: 7.9 G/DL (ref 14–18)
INR PPP: 1.8 (ref 0.8–1.2)
IRON SATN MFR SERPL: 10 % (ref 20–50)
IRON SERPL-MCNC: 15 UG/DL (ref 45–182)
LYMPHOCYTES # SPEC AUTO: 0.8 10^3/UL (ref 1.5–3.5)
LYMPHOCYTES NFR BLD AUTO: 9.3 %
MAGNESIUM SERPL-MCNC: 1.7 MG/DL (ref 1.7–2.8)
MCH RBC QN AUTO: 31.1 PG (ref 27–31)
MCHC RBC AUTO-ENTMCNC: 31.7 G/DL (ref 32–36)
MCV RBC AUTO: 98 FL (ref 80–94)
MONOCYTES # BLD AUTO: 0.7 10^3/UL (ref 0–1)
MONOCYTES NFR BLD AUTO: 7.6 %
NEUTROPHILS # BLD AUTO: 7.4 10^3/UL (ref 1.5–6.6)
NEUTROPHILS # SNV AUTO: 9 X10^3/UL (ref 4.8–10.8)
NEUTROPHILS NFR BLD AUTO: 82 %
PDW BLD AUTO: 10.5 FL (ref 7.4–11.4)
PLATELET # BLD: 256 10^3/UL (ref 130–450)
PROT SPEC-MCNC: 6.2 G/DL (ref 6.7–8.2)
PROTHROM ACT/NOR PPP: 19.6 SECS (ref 9.9–12.6)
RBC MAR: 2.54 10^6/UL (ref 4.7–6.1)
SODIUM SERPLBLD-SCNC: 135 MMOL/L (ref 135–145)
TIBC SERPL-MCNC: 148 UG/DL (ref 250–450)
TRANSFERRIN SERPL-MCNC: 106 MG/DL (ref 180–329)
VIT B12 SERPL-MCNC: 589 PG/ML (ref 180–914)

## 2019-12-26 RX ADMIN — ACETAMINOPHEN PRN MG: 325 TABLET ORAL at 08:07

## 2019-12-26 RX ADMIN — SENNOSIDES SCH MG: 8.6 TABLET, FILM COATED ORAL at 08:07

## 2019-12-26 RX ADMIN — ACETAMINOPHEN PRN MG: 325 TABLET ORAL at 17:07

## 2019-12-26 RX ADMIN — ACETAMINOPHEN PRN MG: 325 TABLET ORAL at 00:09

## 2019-12-26 RX ADMIN — FERROUS GLUCONATE SCH MG: 324 TABLET ORAL at 12:00

## 2019-12-26 RX ADMIN — DOCUSATE SODIUM SCH MG: 250 CAPSULE, LIQUID FILLED ORAL at 08:08

## 2019-12-26 RX ADMIN — SODIUM CHLORIDE, PRESERVATIVE FREE SCH: 5 INJECTION INTRAVENOUS at 17:00

## 2019-12-26 RX ADMIN — SODIUM CHLORIDE, PRESERVATIVE FREE SCH: 5 INJECTION INTRAVENOUS at 07:45

## 2019-12-26 RX ADMIN — SODIUM CHLORIDE, PRESERVATIVE FREE SCH: 5 INJECTION INTRAVENOUS at 00:11

## 2019-12-26 RX ADMIN — PANTOPRAZOLE SODIUM SCH MG: 40 TABLET, DELAYED RELEASE ORAL at 06:31

## 2019-12-26 NOTE — PROVIDER PROGRESS NOTE
Assessment/Plan





- Problem List


(1) UTI (urinary tract infection)


Qualifiers: 


   Urinary tract infection type: site unspecified   Hematuria presence: without 

hematuria   Qualified Code(s): N39.0 - Urinary tract infection, site not 

specified   


Assessment/Plan: 


He had a fever overnight and spiked a fever this morning. WBC has been normal.


He has an under-treated UTI, since there were 2 bacteria, and one was not 

sensitive to the Cipro. He is now on Ceftriaxone iv, based on the sensitivities 

of the last urine cultures from 5 days ago.


New urine cultures were sent, await the results.


Blood cultures were sent in the ER, and will order another blood culture now 

with the morning fever.


Continue empiric IV ceftriaxone








(2) Anemia


Assessment/Plan: 


Hemoglobin dropped from 12 to 7.7 over 2 months.


Follow CBC daily.


Transfuse if hemoglobin under 7.


Evaluate for source of presumed GI blood loss: Heme positive stool was found in 

the ER.


Start po Iron replacement.


Will request the Gen surgery consult for EGD when his fever breaks.








(3) GI bleed


Assessment/Plan: 


Patient had not noted dark stools.


He has been noticing more fatigue and lethargy for weeks.


Heme positive stools were found by guaiac test in the ER.


Follow CBC daily.


Crossmatch of blood has been ordered but no transfusion unless hemoglobin under 

7.


He will need an EGD and possible colonoscopy.


Will await surgical consult for EGD to be requested until after his fever 

breaks. I told the patient and daughter-in-law, at bedside, this plan.








(4) NIXON (acute kidney injury)


Assessment/Plan: 


BUN/creat are improving from 37/1.4 to 26/1.2 with gentle saline hydration and 

stopping HCTZ.


Follow BMP daily.








(5) Hypokalemia


Assessment/Plan: 


Low K from HCTZ use, probably.


Replace po and iv.


Follow BMP.








(6) Hypertension


Assessment/Plan: 


He was only on HCTZ for blood pressure control.  


Blood pressure here is running 117-120 systolic, on no BP meds.


Hydralazine IV as needed has been ordered to use here if needed








(7) Bladder cancer


Assessment/Plan: 


As per Hx.








- Current Meds


Current Meds: 





                               Current Medications











Generic Name Dose Route Start Last Admin





  Trade Name Freq  PRN Reason Stop Dose Admin


 


Acetaminophen  650 mg  12/25/19 17:08  12/26/19 08:07





  Tylenol  PO   650 mg





  Q4HR PRN   Administration





  Pain or Fever > 38C (100.4F)   





     





     





     


 


Cyclobenzaprine HCl  5 mg  12/25/19 19:41  12/25/19 21:37





  Flexeril  PO   5 mg





  TID PRN   Administration





  Spasms   





     





     





     


 


Docusate Sodium  250 - 500 mg  12/26/19 09:00  12/26/19 08:08





  Colace 250mg Capsule  PO   250 mg





  DAILY AMANDA   Administration





     





     





     





     


 


Sodium Chloride  1,000 mls @ 0 mls/hr  12/25/19 18:00  12/26/19 10:28





  Normal Saline 0.9%  IV   100 mls/hr





  .Q0M AMANDA   Infusion





     





     





     





  TKO   


 


Ceftriaxone Sodium 1 gm/  100 mls @ 200 mls/hr  12/26/19 09:00  12/26/19 08:38





  Sodium Chloride  IV   Infused





  DAILY AMANDA   Infusion





     





     





     





     


 


Pantoprazole Sodium  40 mg  12/26/19 07:00  12/26/19 06:31





  Protonix  PO   40 mg





  QDAC AMANDA   Administration





     





     





     





     


 


Polyethylene Glycol  17 gm  12/26/19 09:00  12/26/19 08:08





  Miralax  PO   17 gm





  DAILY AMANDA   Administration





     





     





     





     


 


Senna  8.6 - 17.2 mg  12/26/19 09:00  12/26/19 08:07





  Senokot  PO   8.6 mg





  DAILY AMANDA   Administration





     





     





     





     


 


Sodium Chloride  10 ml  12/25/19 17:08  12/25/19 18:48





  Normal Saline Flush 0.9%  IVP   10 ml





  PRN PRN   Administration





  AS NEEDED PER PROVIDER ORDERS   





     





     





     


 


Sodium Chloride  10 ml  12/26/19 01:00  12/26/19 07:45





  Normal Saline Flush 0.9%  IVP   Not Given





  0100,0900,1700 AMANDA   





     





     





     





     














- Lab Result


Fish Bone Diagrams: 


                                 12/26/19 04:25





                                 12/26/19 04:25





- Additional Planning


My Orders: 





My Active Orders





12/25/19 17:08


Acetaminophen [Tylenol]   650 mg PO Q4HR PRN 


Ondansetron Inj [Zofran Inj]   4 mg IVP Q6HR PRN 


Sodium Chloride Flush 0.9% [Normal Saline Flush 0.9%]   10 ml IVP PRN PRN 





12/25/19 17:09


Activity Orders [RC] Q2HR 


IO [RC] IOSHIFT 


Initiate Bowel Care Protocol [RC] .protocol 


Initiate Line Care Protocol [RC] QSHIFT 


Initiate Personal Care Protoco [RC] .protocol 


Oxygen Therapy [RC] .PRN 


Vital Signs [RC] 0800,1600,0000 


Code Status [OTHERS] Routine 


Condition of Patient [OTHERS] Routine 


DVT Prophylaxis [OTHERS] Routine 





12/25/19 17:11


Daily Weight [RC] 0600 


IV Insert [RC] .ONCE 


SCDs [RC] QSHIFT 





12/25/19 18:00


Sodium Chloride 0.9% [Normal Saline 0.9%] 1,000 ml IV TKO 





12/25/19 Dinner


Regular Diet [DIET] 





12/26/19 01:00


Sodium Chloride Flush 0.9% [Normal Saline Flush 0.9%]   10 ml IVP 0100,0900,1700







12/26/19 08:30


CULTURE, BLOOD #1 [RM] Stat 





12/26/19 09:00


Docusate Sodium 250Mg Capsule [Colace 250Mg Capsule]   250 - 500 mg PO DAILY 


Polyethylene Glycol 3350 [Miralax]   17 gm PO DAILY 


Senna [Senokot]   8.6 - 17.2 mg PO DAILY 


cefTRIAXone [Rocephin] 1 gm   Sodium Chloride 0.9% Minibag [Normal Saline 0.9% 

Minibag] 100 ml IV DAILY 





12/26/19 15:00


POTASSIUM [CHEM] Timed 


PT WITH INR [COAG] Routine 





12/27/19 05:00


CBC - COMP BLD CT W/AUTO DIFF [HEME] DAILYLAB 


CMP [COMPREHENSIVE METABOLIC PANEL] [CHEM] DAILYLAB 





12/28/19 05:00


CBC - COMP BLD CT W/AUTO DIFF [HEME] DAILYLAB 


CMP [COMPREHENSIVE METABOLIC PANEL] [CHEM] DAILYLAB 














Subjective





- Subjective


Patient Reports: Feeling Better, Resting Comfortably





Objective


Vital Signs: 





                               Vital Signs - 24 hr











  12/25/19 12/25/19 12/25/19





  13:00 16:16 18:04


 


Temperature  36.4 C L 37.3 C


 


Heart Rate 50 L 66 


 


Heart Rate [   





Brachial]   


 


Heart Rate [   81





Monitoring   





electrodes]   


 


Respiratory 16 14 





Rate   


 


Blood Pressure 106/60 130/74 


 


Blood Pressure   





[Left Brachial   





artery]   


 


Blood Pressure   154/62 H





[Right Brachial   





artery]   


 


O2 Saturation 96 97 90 L














  12/25/19 12/25/19 12/26/19





  20:25 23:11 00:20


 


Temperature 38.0 C H 39.0 C H 38.1 C H


 


Heart Rate 84  


 


Heart Rate [   





Brachial]   


 


Heart Rate [  84 





Monitoring   





electrodes]   


 


Respiratory 18 18 





Rate   


 


Blood Pressure   


 


Blood Pressure  145/59 H 





[Left Brachial   





artery]   


 


Blood Pressure   





[Right Brachial   





artery]   


 


O2 Saturation 96 96 














  12/26/19 12/26/19 12/26/19





  01:00 01:38 03:02


 


Temperature 38.0 C H 37.6 C H 37.6 C H


 


Heart Rate   


 


Heart Rate [   





Brachial]   


 


Heart Rate [   73





Monitoring   





electrodes]   


 


Respiratory   17





Rate   


 


Blood Pressure   


 


Blood Pressure   133/56 H





[Left Brachial   





artery]   


 


Blood Pressure   





[Right Brachial   





artery]   


 


O2 Saturation   97














  12/26/19 12/26/19





  07:37 10:29


 


Temperature 38.2 C H 36.9 C


 


Heart Rate  


 


Heart Rate [ 80 





Brachial]  


 


Heart Rate [  





Monitoring  





electrodes]  


 


Respiratory 20 





Rate  


 


Blood Pressure  


 


Blood Pressure 118/51 L 





[Left Brachial  





artery]  


 


Blood Pressure  





[Right Brachial  





artery]  


 


O2 Saturation 99 








                                     Oxygen











O2 Source                      Nasal cannula














I&O (Last 24 Hrs): 





                          Intake and Output Totals x24h











 12/24/19 12/25/19 12/26/19





 23:59 23:59 23:59


 


Intake Total  280 694.167


 


Output Total  75 


 


Balance  205 694.167











General: Alert, Oriented x3


HEENT: Mucous membr. moist/pink, Other (Pale)


Neck: Supple


Neuro: Alert, Non Focal


Cardiovascular: Regular rate


Respiratory: No respiratory distress


Abdomen: Normal bowel sounds, Soft


Extremities: No edema





- Results


Results: 





                               Laboratory Results











WBC  9.0 x10^3/uL (4.8-10.8)   12/26/19  04:25    


 


RBC  2.54 10^6/uL (4.70-6.10)  L  12/26/19  04:25    


 


Hgb  7.9 g/dL (14.0-18.0)  L  12/26/19  04:25    


 


Hct  24.9 % (42.0-52.0)  L  12/26/19  04:25    


 


MCV  98.0 fL (80.0-94.0)  H  12/26/19  04:25    


 


MCH  31.1 pg (27.0-31.0)  H  12/26/19  04:25    


 


MCHC  31.7 g/dL (32.0-36.0)  L  12/26/19  04:25    


 


RDW  12.6 % (12.0-15.0)   12/26/19  04:25    


 


Plt Count  256 10^3/uL (130-450)   12/26/19  04:25    


 


MPV  10.5 fL (7.4-11.4)   12/26/19  04:25    


 


Neut # (Auto)  7.4 10^3/uL (1.5-6.6)  H  12/26/19  04:25    


 


Lymph # (Auto)  0.8 10^3/uL (1.5-3.5)  L  12/26/19  04:25    


 


Mono # (Auto)  0.7 10^3/uL (0.0-1.0)   12/26/19  04:25    


 


Eos # (Auto)  0.0 10^3/uL (0.0-0.7)   12/26/19  04:25    


 


Baso # (Auto)  0.0 10^3/uL (0.0-0.1)   12/26/19  04:25    


 


Absolute Nucleated RBC  0.00 x10^3/uL  12/26/19  04:25    


 


Nucleated RBC %  0.0 /100WBC  12/26/19  04:25    


 


Sodium  135 mmol/L (135-145)   12/26/19  04:25    


 


Potassium  3.1 mmol/L (3.5-5.0)  L  12/26/19  04:25    


 


Chloride  96 mmol/L (101-111)  L  12/26/19  04:25    


 


Carbon Dioxide  29 mmol/L (21-32)   12/26/19  04:25    


 


Anion Gap  10.0  (6-13)   12/26/19  04:25    


 


BUN  27 mg/dL (6-20)  H  12/26/19  04:25    


 


Creatinine  1.2 mg/dL (0.6-1.2)   12/26/19  04:25    


 


Estimated GFR (MDRD)  57  (>89)  L  12/26/19  04:25    


 


Glucose  120 mg/dL ()  H  12/26/19  04:25    


 


Lactic Acid  1.4 mmol/L (0.5-2.2)   12/25/19  11:53    


 


Calcium  8.0 mg/dL (8.5-10.3)  L  12/26/19  04:25    


 


Magnesium  1.7 mg/dL (1.7-2.8)   12/26/19  04:25    


 


Iron  15 ug/dL ()  L  12/26/19  04:25    


 


TIBC  148 ug/dL (250-450)  L  12/26/19  04:25    


 


% Saturation  10 % (20-50)  L  12/26/19  04:25    


 


Transferrin  106 mg/dL (180-329)  L  12/26/19  04:25    


 


Total Bilirubin  0.5 mg/dL (0.2-1.0)   12/26/19  04:25    


 


AST  45 IU/L (10-42)  H  12/26/19  04:25    


 


ALT  40 IU/L (10-60)   12/26/19  04:25    


 


Alkaline Phosphatase  73 IU/L ()   12/26/19  04:25    


 


Total Protein  6.2 g/dL (6.7-8.2)  L  12/26/19  04:25    


 


Albumin  2.2 g/dL (3.2-5.5)  L  12/26/19  04:25    


 


Globulin  4.0 g/dL (2.1-4.2)   12/26/19  04:25    


 


Albumin/Globulin Ratio  0.6  (1.0-2.2)  L  12/26/19  04:25    


 


Lipase  35 U/L (22-51)   12/25/19  11:53    


 


Vitamin B12  589 pg/mL (180-914)   12/26/19  04:25    


 


Folate  12.69 ng/mL (5.90 - >24.8)   12/26/19  04:25    


 


Urine Color  YELLOW   12/25/19  14:28    


 


Urine Clarity  CLOUDY  (CLEAR)   12/25/19  14:28    


 


Urine pH  5.0 PH (5.0-7.5)   12/25/19  14:28    


 


Ur Specific Gravity  >=1.030  (1.002-1.030)  H  12/25/19  14:28    


 


Urine Protein  100 mg/dL (NEGATIVE)  H  12/25/19  14:28    


 


Urine Glucose (UA)  NEGATIVE mg/dL (NEGATIVE)   12/25/19  14:28    


 


Urine Ketones  NEGATIVE mg/dL (NEGATIVE)   12/25/19  14:28    


 


Urine Occult Blood  SMALL  (NEGATIVE)  H  12/25/19  14:28    


 


Urine Nitrite  NEGATIVE  (NEGATIVE)   12/25/19  14:28    


 


Urine Bilirubin  SMALL  (NEGATIVE)  H  12/25/19  14:28    


 


Urine Urobilinogen  1 (NORMAL) E.U./dL (NORMAL)   12/25/19  14:28    


 


Ur Leukocyte Esterase  SMALL  (NEGATIVE)  H  12/25/19  14:28    


 


Urine RBC  0-5 /HPF (0-5)   12/25/19  14:28    


 


Urine WBC  >25 /HPF (0-3)  H  12/25/19  14:28    


 


Ur Squamous Epith Cells  RARE Squamous  (<= Few)   12/25/19  14:28    


 


Amorphous Sediment  Few /LPF  12/25/19  14:28    


 


Urine Bacteria  Few /HPF (None Seen)   12/25/19  14:28    


 


Urine Casts  0-2 Hyaline Casts /LPF  12/25/19  14:28    


 


Ur Microscopic Review  INDICATED   12/25/19  14:28    


 


Urine Culture Comments  INDICATED   12/25/19  14:28    


 


Blood Type  A POSITIVE   12/25/19  16:39    


 


Blood Type Recheck  A POSITIVE   12/25/19  11:53    


 


Antibody Screen  NEGATIVE   12/25/19  16:39

## 2019-12-27 LAB
ALBUMIN DIAFP-MCNC: 2.3 G/DL (ref 3.2–5.5)
ALBUMIN/GLOB SERPL: 0.5 {RATIO} (ref 1–2.2)
ALP SERPL-CCNC: 98 IU/L (ref 42–121)
ALT SERPL W P-5'-P-CCNC: 48 IU/L (ref 10–60)
ANION GAP SERPL CALCULATED.4IONS-SCNC: 12 MMOL/L (ref 6–13)
AST SERPL W P-5'-P-CCNC: 56 IU/L (ref 10–42)
BASOPHILS NFR BLD AUTO: 0 10^3/UL (ref 0–0.1)
BASOPHILS NFR BLD AUTO: 0.3 %
BILIRUB BLD-MCNC: 0.4 MG/DL (ref 0.2–1)
BUN SERPL-MCNC: 26 MG/DL (ref 6–20)
CALCIUM UR-MCNC: 8.7 MG/DL (ref 8.5–10.3)
CHLORIDE SERPL-SCNC: 98 MMOL/L (ref 101–111)
CO2 SERPL-SCNC: 29 MMOL/L (ref 21–32)
CREAT SERPLBLD-SCNC: 1 MG/DL (ref 0.6–1.2)
EOSINOPHIL # BLD AUTO: 0 10^3/UL (ref 0–0.7)
EOSINOPHIL NFR BLD AUTO: 0.1 %
ERYTHROCYTE [DISTWIDTH] IN BLOOD BY AUTOMATED COUNT: 12.6 % (ref 12–15)
GFRSERPLBLD MDRD-ARVRAT: 71 ML/MIN/{1.73_M2} (ref 89–?)
GLOBULIN SER-MCNC: 4.8 G/DL (ref 2.1–4.2)
GLUCOSE SERPL-MCNC: 134 MG/DL (ref 70–100)
HGB UR QL STRIP: 9.1 G/DL (ref 14–18)
LYMPHOCYTES # SPEC AUTO: 1.2 10^3/UL (ref 1.5–3.5)
LYMPHOCYTES NFR BLD AUTO: 9.2 %
MCH RBC QN AUTO: 31.4 PG (ref 27–31)
MCHC RBC AUTO-ENTMCNC: 31.9 G/DL (ref 32–36)
MCV RBC AUTO: 98.3 FL (ref 80–94)
MONOCYTES # BLD AUTO: 0.7 10^3/UL (ref 0–1)
MONOCYTES NFR BLD AUTO: 5.4 %
NEUTROPHILS # BLD AUTO: 11.2 10^3/UL (ref 1.5–6.6)
NEUTROPHILS # SNV AUTO: 13.2 X10^3/UL (ref 4.8–10.8)
NEUTROPHILS NFR BLD AUTO: 84.4 %
PDW BLD AUTO: 10.5 FL (ref 7.4–11.4)
PLATELET # BLD: 287 10^3/UL (ref 130–450)
PROT SPEC-MCNC: 7.1 G/DL (ref 6.7–8.2)
RBC MAR: 2.9 10^6/UL (ref 4.7–6.1)
SODIUM SERPLBLD-SCNC: 139 MMOL/L (ref 135–145)

## 2019-12-27 RX ADMIN — DOCUSATE SODIUM SCH MG: 250 CAPSULE, LIQUID FILLED ORAL at 08:20

## 2019-12-27 RX ADMIN — PANTOPRAZOLE SODIUM SCH MG: 40 TABLET, DELAYED RELEASE ORAL at 06:19

## 2019-12-27 RX ADMIN — SODIUM CHLORIDE, PRESERVATIVE FREE SCH: 5 INJECTION INTRAVENOUS at 10:52

## 2019-12-27 RX ADMIN — SODIUM CHLORIDE, PRESERVATIVE FREE SCH: 5 INJECTION INTRAVENOUS at 00:59

## 2019-12-27 RX ADMIN — SODIUM CHLORIDE SCH MLS/HR: 9 INJECTION, SOLUTION INTRAVENOUS at 08:56

## 2019-12-27 RX ADMIN — SODIUM CHLORIDE SCH MLS/HR: 900 INJECTION INTRAVENOUS at 08:20

## 2019-12-27 RX ADMIN — SENNOSIDES SCH MG: 8.6 TABLET, FILM COATED ORAL at 08:19

## 2019-12-27 RX ADMIN — SODIUM CHLORIDE, PRESERVATIVE FREE SCH ML: 5 INJECTION INTRAVENOUS at 17:15

## 2019-12-27 RX ADMIN — FERROUS GLUCONATE SCH MG: 324 TABLET ORAL at 08:19

## 2019-12-27 NOTE — PROVIDER PROGRESS NOTE
Assessment/Plan





- Problem List


(1) UTI (urinary tract infection)


Qualifiers: 


   Urinary tract infection type: site unspecified   Hematuria presence: without 

hematuria   Qualified Code(s): N39.0 - Urinary tract infection, site not 

specified   


Assessment/Plan: 


The Rocephin dose was increased from 1 g to 2 g daily, starting today, because 

of continued fevers for the past 48 hours.


As of midnight, there is been no fever today.


Await urine cultures.


Await blood cultures which are all negative to date








(2) Anemia


Assessment/Plan: 


GI blood loss anemia is suspected to be the reason.


He has been started on oral iron.


Will request general surgery for EGD, once his fever has broken.








(3) GI bleed


Assessment/Plan: 


As above in #3








(4) NIXON (acute kidney injury)


Assessment/Plan: 


Improving with IV hydration








(5) Hypertension


Assessment/Plan: 


Stable blood pressures on current management








(6) Bladder cancer


Assessment/Plan: 


As per history, followed by oncology








(7) Hypokalemia


Assessment/Plan: 


Resolved








- Current Meds


Current Meds: 





                               Current Medications











Generic Name Dose Route Start Last Admin





  Trade Name Freq  PRN Reason Stop Dose Admin


 


Acetaminophen  650 mg  12/25/19 17:08  12/26/19 17:07





  Tylenol  PO   650 mg





  Q4HR PRN   Administration





  Pain or Fever > 38C (100.4F)   





     





     





     


 


Cyclobenzaprine HCl  5 mg  12/25/19 19:41  12/25/19 21:37





  Flexeril  PO   5 mg





  TID PRN   Administration





  Spasms   





     





     





     


 


Docusate Sodium  250 - 500 mg  12/26/19 09:00  12/27/19 08:20





  Colace 250mg Capsule  PO   250 mg





  DAILY AMANDA   Administration





     





     





     





     


 


Ferrous Gluconate  324 mg  12/26/19 12:00  12/27/19 08:19





  Fergon  PO   324 mg





  DAILYWM AMANDA   Administration





     





     





     





     


 


Sodium Chloride  1,000 mls @ 0 mls/hr  12/25/19 18:00  12/27/19 08:56





  Normal Saline 0.9%  IV   25 mls/hr





  .Q0M AMANDA   Administration





     





     





     





  TKO   


 


Ceftriaxone Sodium 2 gm/  100 mls @ 200 mls/hr  12/27/19 09:00  12/27/19 08:55





  Sodium Chloride  IV   Infused





  DAILY AMANDA   Infusion





     





     





     





     


 


Pantoprazole Sodium  40 mg  12/26/19 07:00  12/27/19 06:19





  Protonix  PO   40 mg





  QDAC AMANDA   Administration





     





     





     





     


 


Polyethylene Glycol  17 gm  12/26/19 09:00  12/27/19 10:52





  Miralax  PO   17 gm





  DAILY AMANDA   Administration





     





     





     





     


 


Senna  8.6 - 17.2 mg  12/26/19 09:00  12/27/19 08:19





  Senokot  PO   17.2 mg





  DAILY AMANDA   Administration





     





     





     





     


 


Sodium Chloride  10 ml  12/25/19 17:08  12/25/19 18:48





  Normal Saline Flush 0.9%  IVP   10 ml





  PRN PRN   Administration





  AS NEEDED PER PROVIDER ORDERS   





     





     





     


 


Sodium Chloride  10 ml  12/26/19 01:00  12/27/19 10:52





  Normal Saline Flush 0.9%  IVP   Not Given





  0100,0900,1700 AMANDA   





     





     





     





     














- Lab Result


Fish Bone Diagrams: 


                                 12/27/19 04:20





                                 12/27/19 04:20





- Additional Planning


My Orders: 





My Active Orders





12/28/19 05:00


CBC - COMP BLD CT W/AUTO DIFF [HEME] DAILYLAB 


CMP [COMPREHENSIVE METABOLIC PANEL] [CHEM] DAILYLAB 














Subjective





- Subjective


Patient Reports: Other (Has shaking chills. No BM since here, but usually has a 

BM every 2 days, and also has not eaten much for 3-5 days.)





Objective


Vital Signs: 





                               Vital Signs - 24 hr











  12/26/19 12/26/19 12/26/19





  15:51 17:08 18:25


 


Temperature 38.6 C H 39.1 C H 37.3 C


 


Heart Rate [ 84  





Brachial]   


 


Respiratory 20  





Rate   


 


Blood Pressure 148/65 H  





[Left Brachial   





artery]   


 


Blood Pressure   





[Right Brachial   





artery]   


 


O2 Saturation 95  














  12/26/19 12/26/19 12/27/19





  19:37 21:01 00:00


 


Temperature 36.8 C 36.4 C L 36.6 C


 


Heart Rate [  65 79





Brachial]   


 


Respiratory  20 18





Rate   


 


Blood Pressure  105/56 L 134/71 H





[Left Brachial   





artery]   


 


Blood Pressure   





[Right Brachial   





artery]   


 


O2 Saturation  94 92














  12/27/19 12/27/19





  08:00 11:30


 


Temperature 36.7 C 36.8 C


 


Heart Rate [ 91 79





Brachial]  


 


Respiratory 18 18





Rate  


 


Blood Pressure 140/58 H 





[Left Brachial  





artery]  


 


Blood Pressure  132/61 H





[Right Brachial  





artery]  


 


O2 Saturation 94 94








                                     Oxygen











O2 Source                      Room air














I&O (Last 24 Hrs): 





                          Intake and Output Totals x24h











 12/25/19 12/26/19 12/27/19





 23:59 23:59 23:59


 


Intake Total 280 6895.100 0558.833


 


Output Total 75  75


 


Balance 205 8247.012 5246.833











General: Alert, Oriented x3, Other (Pale)


HEENT: Mucous membr. moist/pink


Neck: Supple


Neuro: Alert


Cardiovascular: Regular rate, No murmurs


Respiratory: No respiratory distress


Abdomen: Normal bowel sounds, Soft


Extremities: No edema





- Results


Results: 





                               Laboratory Results











WBC  13.2 x10^3/uL (4.8-10.8)  H  12/27/19  04:20    


 


RBC  2.90 10^6/uL (4.70-6.10)  L  12/27/19  04:20    


 


Hgb  9.1 g/dL (14.0-18.0)  L  12/27/19  04:20    


 


Hct  28.5 % (42.0-52.0)  L  12/27/19  04:20    


 


MCV  98.3 fL (80.0-94.0)  H  12/27/19  04:20    


 


MCH  31.4 pg (27.0-31.0)  H  12/27/19  04:20    


 


MCHC  31.9 g/dL (32.0-36.0)  L  12/27/19  04:20    


 


RDW  12.6 % (12.0-15.0)   12/27/19  04:20    


 


Plt Count  287 10^3/uL (130-450)   12/27/19  04:20    


 


MPV  10.5 fL (7.4-11.4)   12/27/19  04:20    


 


Neut # (Auto)  11.2 10^3/uL (1.5-6.6)  H  12/27/19  04:20    


 


Lymph # (Auto)  1.2 10^3/uL (1.5-3.5)  L  12/27/19  04:20    


 


Mono # (Auto)  0.7 10^3/uL (0.0-1.0)   12/27/19  04:20    


 


Eos # (Auto)  0.0 10^3/uL (0.0-0.7)   12/27/19  04:20    


 


Baso # (Auto)  0.0 10^3/uL (0.0-0.1)   12/27/19  04:20    


 


Absolute Nucleated RBC  0.00 x10^3/uL  12/27/19  04:20    


 


Nucleated RBC %  0.0 /100WBC  12/27/19  04:20    


 


PT  19.6 secs (9.9-12.6)  H  12/26/19  15:00    


 


INR  1.8  (0.8-1.2)  H  12/26/19  15:00    


 


Sodium  139 mmol/L (135-145)   12/27/19  04:20    


 


Potassium  3.6 mmol/L (3.5-5.0)   12/27/19  04:20    


 


Chloride  98 mmol/L (101-111)  L  12/27/19  04:20    


 


Carbon Dioxide  29 mmol/L (21-32)   12/27/19  04:20    


 


Anion Gap  12.0  (6-13)   12/27/19  04:20    


 


BUN  26 mg/dL (6-20)  H  12/27/19  04:20    


 


Creatinine  1.0 mg/dL (0.6-1.2)   12/27/19  04:20    


 


Estimated GFR (MDRD)  71  (>89)  L  12/27/19  04:20    


 


Glucose  134 mg/dL ()  H  12/27/19  04:20    


 


Lactic Acid  1.4 mmol/L (0.5-2.2)   12/25/19  11:53    


 


Calcium  8.7 mg/dL (8.5-10.3)   12/27/19  04:20    


 


Magnesium  1.7 mg/dL (1.7-2.8)   12/26/19  04:25    


 


Iron  15 ug/dL ()  L  12/26/19  04:25    


 


TIBC  148 ug/dL (250-450)  L  12/26/19  04:25    


 


% Saturation  10 % (20-50)  L  12/26/19  04:25    


 


Transferrin  106 mg/dL (180-329)  L  12/26/19  04:25    


 


Total Bilirubin  0.4 mg/dL (0.2-1.0)   12/27/19  04:20    


 


AST  56 IU/L (10-42)  H  12/27/19  04:20    


 


ALT  48 IU/L (10-60)   12/27/19  04:20    


 


Alkaline Phosphatase  98 IU/L ()   12/27/19  04:20    


 


Total Protein  7.1 g/dL (6.7-8.2)   12/27/19  04:20    


 


Albumin  2.3 g/dL (3.2-5.5)  L  12/27/19  04:20    


 


Globulin  4.8 g/dL (2.1-4.2)  H  12/27/19  04:20    


 


Albumin/Globulin Ratio  0.5  (1.0-2.2)  L  12/27/19  04:20    


 


Lipase  35 U/L (22-51)   12/25/19  11:53    


 


Vitamin B12  589 pg/mL (180-914)   12/26/19  04:25    


 


Folate  12.69 ng/mL (5.90 - >24.8)   12/26/19  04:25    


 


Urine Color  YELLOW   12/25/19  14:28    


 


Urine Clarity  CLOUDY  (CLEAR)   12/25/19  14:28    


 


Urine pH  5.0 PH (5.0-7.5)   12/25/19  14:28    


 


Ur Specific Gravity  >=1.030  (1.002-1.030)  H  12/25/19  14:28    


 


Urine Protein  100 mg/dL (NEGATIVE)  H  12/25/19  14:28    


 


Urine Glucose (UA)  NEGATIVE mg/dL (NEGATIVE)   12/25/19  14:28    


 


Urine Ketones  NEGATIVE mg/dL (NEGATIVE)   12/25/19  14:28    


 


Urine Occult Blood  SMALL  (NEGATIVE)  H  12/25/19  14:28    


 


Urine Nitrite  NEGATIVE  (NEGATIVE)   12/25/19  14:28    


 


Urine Bilirubin  SMALL  (NEGATIVE)  H  12/25/19  14:28    


 


Urine Urobilinogen  1 (NORMAL) E.U./dL (NORMAL)   12/25/19  14:28    


 


Ur Leukocyte Esterase  SMALL  (NEGATIVE)  H  12/25/19  14:28    


 


Urine RBC  0-5 /HPF (0-5)   12/25/19  14:28    


 


Urine WBC  >25 /HPF (0-3)  H  12/25/19  14:28    


 


Ur Squamous Epith Cells  RARE Squamous  (<= Few)   12/25/19  14:28    


 


Amorphous Sediment  Few /LPF  12/25/19  14:28    


 


Urine Bacteria  Few /HPF (None Seen)   12/25/19  14:28    


 


Urine Casts  0-2 Hyaline Casts /LPF  12/25/19  14:28    


 


Ur Microscopic Review  INDICATED   12/25/19  14:28    


 


Urine Culture Comments  INDICATED   12/25/19  14:28    


 


Blood Type  A POSITIVE   12/25/19  16:39    


 


Blood Type Recheck  A POSITIVE   12/25/19  11:53    


 


Antibody Screen  NEGATIVE   12/25/19  16:39

## 2019-12-27 NOTE — PHARMACY PROGRESS NOTE
- Best Possible Medication History


Admit Date and Time: 12/25/19 9019


Processed by: Pharmacy


Medication History completed: Yes


Patient Interview: Completed


Secondary Source(s): Pharmacy records (pharmacy unable to provide confirmation 

of hydrochlorothiazide)





As the person ultimately responsible for medication therapy, providers are able 

to order a medication from an existing home medication list in [facility] via the 

"Reconcile Routine" prior to Confirmation of that medication by support staff. 

Such practice is discouraged except when the physician, in their clinical 

judgment, deems that a medical need exists for a medication without regard to 

previous use.

## 2019-12-28 LAB
ALBUMIN DIAFP-MCNC: 1.9 G/DL (ref 3.2–5.5)
ALBUMIN/GLOB SERPL: 0.5 {RATIO} (ref 1–2.2)
ALP SERPL-CCNC: 75 IU/L (ref 42–121)
ALT SERPL W P-5'-P-CCNC: 35 IU/L (ref 10–60)
ANION GAP SERPL CALCULATED.4IONS-SCNC: 8 MMOL/L (ref 6–13)
AST SERPL W P-5'-P-CCNC: 39 IU/L (ref 10–42)
BASOPHILS NFR BLD AUTO: 0.1 10^3/UL (ref 0–0.1)
BASOPHILS NFR BLD AUTO: 0.6 %
BILIRUB BLD-MCNC: 0.4 MG/DL (ref 0.2–1)
BUN SERPL-MCNC: 23 MG/DL (ref 6–20)
CALCIUM UR-MCNC: 7.9 MG/DL (ref 8.5–10.3)
CHLORIDE SERPL-SCNC: 98 MMOL/L (ref 101–111)
CO2 SERPL-SCNC: 29 MMOL/L (ref 21–32)
CREAT SERPLBLD-SCNC: 1 MG/DL (ref 0.6–1.2)
EOSINOPHIL # BLD AUTO: 0 10^3/UL (ref 0–0.7)
EOSINOPHIL NFR BLD AUTO: 0.1 %
ERYTHROCYTE [DISTWIDTH] IN BLOOD BY AUTOMATED COUNT: 12.9 % (ref 12–15)
GFRSERPLBLD MDRD-ARVRAT: 71 ML/MIN/{1.73_M2} (ref 89–?)
GLOBULIN SER-MCNC: 3.9 G/DL (ref 2.1–4.2)
GLUCOSE SERPL-MCNC: 124 MG/DL (ref 70–100)
HGB UR QL STRIP: 7.9 G/DL (ref 14–18)
HGB UR QL STRIP: 8.5 G/DL (ref 14–18)
LYMPHOCYTES # SPEC AUTO: 0.7 10^3/UL (ref 1.5–3.5)
LYMPHOCYTES NFR BLD AUTO: 8.1 %
MCH RBC QN AUTO: 31.1 PG (ref 27–31)
MCHC RBC AUTO-ENTMCNC: 32 G/DL (ref 32–36)
MCV RBC AUTO: 97.2 FL (ref 80–94)
MONOCYTES # BLD AUTO: 0.7 10^3/UL (ref 0–1)
MONOCYTES NFR BLD AUTO: 7.4 %
NEUTROPHILS # BLD AUTO: 7.4 10^3/UL (ref 1.5–6.6)
NEUTROPHILS # SNV AUTO: 8.9 X10^3/UL (ref 4.8–10.8)
NEUTROPHILS NFR BLD AUTO: 83.2 %
PDW BLD AUTO: 10.2 FL (ref 7.4–11.4)
PLATELET # BLD: 239 10^3/UL (ref 130–450)
PROT SPEC-MCNC: 5.8 G/DL (ref 6.7–8.2)
RBC MAR: 2.54 10^6/UL (ref 4.7–6.1)
SODIUM SERPLBLD-SCNC: 135 MMOL/L (ref 135–145)

## 2019-12-28 RX ADMIN — SODIUM CHLORIDE, PRESERVATIVE FREE SCH ML: 5 INJECTION INTRAVENOUS at 01:12

## 2019-12-28 RX ADMIN — SODIUM CHLORIDE, PRESERVATIVE FREE SCH: 5 INJECTION INTRAVENOUS at 07:52

## 2019-12-28 RX ADMIN — DOCUSATE SODIUM SCH MG: 250 CAPSULE, LIQUID FILLED ORAL at 08:06

## 2019-12-28 RX ADMIN — SODIUM CHLORIDE SCH MLS/HR: 900 INJECTION INTRAVENOUS at 08:12

## 2019-12-28 RX ADMIN — ACETAMINOPHEN PRN MG: 325 TABLET ORAL at 01:12

## 2019-12-28 RX ADMIN — SENNOSIDES SCH MG: 8.6 TABLET, FILM COATED ORAL at 08:06

## 2019-12-28 RX ADMIN — PANTOPRAZOLE SODIUM SCH MG: 40 TABLET, DELAYED RELEASE ORAL at 06:41

## 2019-12-28 RX ADMIN — SODIUM CHLORIDE, PRESERVATIVE FREE SCH: 5 INJECTION INTRAVENOUS at 17:29

## 2019-12-28 RX ADMIN — FERROUS GLUCONATE SCH MG: 324 TABLET ORAL at 08:06

## 2019-12-28 RX ADMIN — PANTOPRAZOLE SODIUM SCH MG: 40 TABLET, DELAYED RELEASE ORAL at 21:05

## 2019-12-28 RX ADMIN — ACETAMINOPHEN PRN MG: 325 TABLET ORAL at 17:23

## 2019-12-28 NOTE — PROVIDER PROGRESS NOTE
Assessment/Plan





- Problem List


(1) UTI (urinary tract infection)


Qualifiers: 


   Urinary tract infection type: site unspecified   Hematuria presence: without 

hematuria   Qualified Code(s): N39.0 - Urinary tract infection, site not 

specified   


Assessment/Plan: 


The urine culture from 2 days ago remains negative.


Several blood cultures from this admission remain negative.


His antibiotic dose was increased when he had persistent fevers over the first 2

days.  There was still a fever at midnight.


His antibiotics are therefore being based on the last urine cultures that grew 

from his ER visit 7 days ago: Ceftriaxone IV to which the Enterococcus and 

Aerococcus were sensitive.


The white blood count followed the persistent fever, went up from 7.4 to 9 then 

13.2 but today has improved to 8.9.


Will obtain US imaging of the renal system.


A 21-day (minimum) course of treatment is planned, given his prostate Dx.








(2) Anemia


Qualifiers: 


   Anemia type: iron deficiency 


Assessment/Plan: 


The general surgeon consulted on the patient today.  He was able to obtain a hi

story that the patient had taken one & a half bottles of ibuprofen over the past

weeks to treat back pain. There was no Ibuprofen listed on his med list that was

reconciled by our Pharmacy.


He has been started on empiric oral iron replacement.


Hemoglobin is hovering at 7.7-7.9, despite 4 L positive hemodilution with fluid 

replacement. There was one hemoglobin up at 9.2, this appears to be a lab error 

since he did not get a transfusion this admission.


The patient is getting empiric treatment for peptic ulcer disease since 

admission, by virtue of being on meds for ulcer prophylaxis.


Check Hgb this evening to give a transfusion if needed before upper endoscopy, 

per the surgeon's request.


Patient is scheduled for EGD tomorrow morning.








(3) GI bleed


Assessment/Plan: 


As above in #2








(4) Elevated INR


Assessment/Plan: 


This may be an additional contributing factor for a GI bleed.


He was on no Coumadin, therefore this suggests liver synthesis is abnormal.


He has normal LFTs, however he did admit to drinking 1-2 alcohoic drinks per 

day.


Will recheck INR in am and correct if needed before EGD.








(5) NIXON (acute kidney injury)


Assessment/Plan: 


Slow improvement daily in BUN/creat with current hydration and management of 

UTI.


Follow BMP daily.








(6) Hypertension


Assessment/Plan: 


He was only on HCTZ at home for blood pressure treatment.


Stable BP on current management








(7) Bladder cancer


Assessment/Plan: 


Follow-up and management per oncology








(8) Hypokalemia


Assessment/Plan: 


Replace with K.


Follow BMP daily.








- Current Meds


Current Meds: 





                               Current Medications











Generic Name Dose Route Start Last Admin





  Trade Name Freq  PRN Reason Stop Dose Admin


 


Acetaminophen  650 mg  12/25/19 17:08  12/28/19 01:12





  Tylenol  PO   650 mg





  Q4HR PRN   Administration





  Pain or Fever > 38C (100.4F)   





     





     





     


 


Cyclobenzaprine HCl  5 mg  12/25/19 19:41  12/25/19 21:37





  Flexeril  PO   5 mg





  TID PRN   Administration





  Spasms   





     





     





     


 


Docusate Sodium  250 - 500 mg  12/26/19 09:00  12/28/19 08:06





  Colace 250mg Capsule  PO   250 mg





  DAILY AMANDA   Administration





     





     





     





     


 


Ferrous Gluconate  324 mg  12/26/19 12:00  12/28/19 08:06





  Fergon  PO   324 mg





  DAILYWM AMANDA   Administration





     





     





     





     


 


Sodium Chloride  1,000 mls @ 0 mls/hr  12/25/19 18:00  12/27/19 08:56





  Normal Saline 0.9%  IV   25 mls/hr





  .Q0M AMANDA   Administration





     





     





     





  TKO   


 


Ceftriaxone Sodium 2 gm/  100 mls @ 200 mls/hr  12/27/19 09:00  12/28/19 08:42





  Sodium Chloride  IV   Infused





  DAILY AMANDA   Infusion





     





     





     





     


 


Pantoprazole Sodium  40 mg  12/26/19 07:00  12/28/19 06:41





  Protonix  PO   40 mg





  QDAC AMANDA   Administration





     





     





     





     


 


Polyethylene Glycol  17 gm  12/26/19 09:00  12/28/19 08:07





  Miralax  PO   17 gm





  DAILY AMANDA   Administration





     





     





     





     


 


Senna  8.6 - 17.2 mg  12/26/19 09:00  12/28/19 08:06





  Senokot  PO   8.6 mg





  DAILY AMANDA   Administration





     





     





     





     


 


Sodium Chloride  10 ml  12/25/19 17:08  12/25/19 18:48





  Normal Saline Flush 0.9%  IVP   10 ml





  PRN PRN   Administration





  AS NEEDED PER PROVIDER ORDERS   





     





     





     


 


Sodium Chloride  10 ml  12/26/19 01:00  12/28/19 07:52





  Normal Saline Flush 0.9%  IVP   Not Given





  0100,0900,1700 AMANDA   





     





     





     





     














- Lab Result


Fish Bone Diagrams: 


                                 12/28/19 16:45





                                 12/28/19 05:55





- Additional Planning


My Orders: 





My Active Orders





12/28/19


Consult [General Surgery Consult] [CONS] Routine 





12/28/19 11:33


Potassium Chloride [K-Dur]   20 meq PO ONCE ONE 





12/29/19 05:00


BMP - BASIC METABOLIC PANEL [CHEM] DAILYLAB 


CBC - COMP BLD CT W/AUTO DIFF [HEME] DAILYLAB 


PT WITH INR [COAG] DAILYLAB 





12/30/19 05:00


BMP - BASIC METABOLIC PANEL [CHEM] DAILYLAB 


CBC - COMP BLD CT W/AUTO DIFF [HEME] DAILYLAB 





12/31/19 05:00


BMP - BASIC METABOLIC PANEL [CHEM] DAILYLAB 


CBC - COMP BLD CT W/AUTO DIFF [HEME] DAILYLAB 














Subjective





- Subjective


Patient Reports: Feeling Better, Resting Comfortably


Nursing Reports: Other (Had a fever spike at midnight to 38.7)





Objective


Vital Signs: 





                               Vital Signs - 24 hr











  12/27/19 12/28/19 12/28/19





  15:30 00:00 04:17


 


Temperature 37.3 C 38.7 C H 37.0 C


 


Heart Rate [ 82 87 





Brachial]   


 


Respiratory 18 18 





Rate   


 


Blood Pressure  139/75 H 





[Left Brachial   





artery]   


 


Blood Pressure 129/61  





[Right Brachial   





artery]   


 


O2 Saturation 95 92 














  12/28/19





  07:55


 


Temperature 37.3 C


 


Heart Rate [ 79





Brachial] 


 


Respiratory 18





Rate 


 


Blood Pressure 





[Left Brachial 





artery] 


 


Blood Pressure 134/53 H





[Right Brachial 





artery] 


 


O2 Saturation 92








                                     Oxygen











O2 Source                      Room air














I&O (Last 24 Hrs): 





                          Intake and Output Totals x24h











 12/26/19 12/27/19 12/28/19





 23:59 23:59 23:59


 


Intake Total 6843.124 3511.833 970


 


Output Total  75 


 


Balance 9226.254 1640.833 970











General: Alert, Oriented x3


HEENT: Mucous membr. moist/pink


Neck: Supple, No JVD


Neuro: Alert, Non Focal


Cardiovascular: Regular rate


Respiratory: No respiratory distress


Abdomen: Soft


Extremities: No edema





- Results


Results: 





                               Laboratory Results











WBC  8.9 x10^3/uL (4.8-10.8)   12/28/19  05:55    


 


RBC  2.54 10^6/uL (4.70-6.10)  L  12/28/19  05:55    


 


Hgb  7.9 g/dL (14.0-18.0)  L  12/28/19  05:55    


 


Hct  24.7 % (42.0-52.0)  L  12/28/19  05:55    


 


MCV  97.2 fL (80.0-94.0)  H  12/28/19  05:55    


 


MCH  31.1 pg (27.0-31.0)  H  12/28/19  05:55    


 


MCHC  32.0 g/dL (32.0-36.0)   12/28/19  05:55    


 


RDW  12.9 % (12.0-15.0)   12/28/19  05:55    


 


Plt Count  239 10^3/uL (130-450)   12/28/19  05:55    


 


MPV  10.2 fL (7.4-11.4)   12/28/19  05:55    


 


Neut # (Auto)  7.4 10^3/uL (1.5-6.6)  H  12/28/19  05:55    


 


Lymph # (Auto)  0.7 10^3/uL (1.5-3.5)  L  12/28/19  05:55    


 


Mono # (Auto)  0.7 10^3/uL (0.0-1.0)   12/28/19  05:55    


 


Eos # (Auto)  0.0 10^3/uL (0.0-0.7)   12/28/19  05:55    


 


Baso # (Auto)  0.1 10^3/uL (0.0-0.1)   12/28/19  05:55    


 


Absolute Nucleated RBC  0.00 x10^3/uL  12/28/19  05:55    


 


Nucleated RBC %  0.0 /100WBC  12/28/19  05:55    


 


PT  19.6 secs (9.9-12.6)  H  12/26/19  15:00    


 


INR  1.8  (0.8-1.2)  H  12/26/19  15:00    


 


Sodium  135 mmol/L (135-145)   12/28/19  05:55    


 


Potassium  3.4 mmol/L (3.5-5.0)  L  12/28/19  05:55    


 


Chloride  98 mmol/L (101-111)  L  12/28/19  05:55    


 


Carbon Dioxide  29 mmol/L (21-32)   12/28/19  05:55    


 


Anion Gap  8.0  (6-13)   12/28/19  05:55    


 


BUN  23 mg/dL (6-20)  H  12/28/19  05:55    


 


Creatinine  1.0 mg/dL (0.6-1.2)   12/28/19  05:55    


 


Estimated GFR (MDRD)  71  (>89)  L  12/28/19  05:55    


 


Glucose  124 mg/dL ()  H  12/28/19  05:55    


 


Lactic Acid  1.4 mmol/L (0.5-2.2)   12/25/19  11:53    


 


Calcium  7.9 mg/dL (8.5-10.3)  L  12/28/19  05:55    


 


Magnesium  1.7 mg/dL (1.7-2.8)   12/26/19  04:25    


 


Iron  15 ug/dL ()  L  12/26/19  04:25    


 


TIBC  148 ug/dL (250-450)  L  12/26/19  04:25    


 


% Saturation  10 % (20-50)  L  12/26/19  04:25    


 


Transferrin  106 mg/dL (180-329)  L  12/26/19  04:25    


 


Total Bilirubin  0.4 mg/dL (0.2-1.0)   12/28/19  05:55    


 


AST  39 IU/L (10-42)   12/28/19  05:55    


 


ALT  35 IU/L (10-60)   12/28/19  05:55    


 


Alkaline Phosphatase  75 IU/L ()   12/28/19  05:55    


 


Total Protein  5.8 g/dL (6.7-8.2)  L  12/28/19  05:55    


 


Albumin  1.9 g/dL (3.2-5.5)  L  12/28/19  05:55    


 


Globulin  3.9 g/dL (2.1-4.2)   12/28/19  05:55    


 


Albumin/Globulin Ratio  0.5  (1.0-2.2)  L  12/28/19  05:55    


 


Lipase  35 U/L (22-51)   12/25/19  11:53    


 


Vitamin B12  589 pg/mL (180-914)   12/26/19  04:25    


 


Folate  12.69 ng/mL (5.90 - >24.8)   12/26/19  04:25    


 


Urine Color  YELLOW   12/25/19  14:28    


 


Urine Clarity  CLOUDY  (CLEAR)   12/25/19  14:28    


 


Urine pH  5.0 PH (5.0-7.5)   12/25/19  14:28    


 


Ur Specific Gravity  >=1.030  (1.002-1.030)  H  12/25/19  14:28    


 


Urine Protein  100 mg/dL (NEGATIVE)  H  12/25/19  14:28    


 


Urine Glucose (UA)  NEGATIVE mg/dL (NEGATIVE)   12/25/19  14:28    


 


Urine Ketones  NEGATIVE mg/dL (NEGATIVE)   12/25/19  14:28    


 


Urine Occult Blood  SMALL  (NEGATIVE)  H  12/25/19  14:28    


 


Urine Nitrite  NEGATIVE  (NEGATIVE)   12/25/19  14:28    


 


Urine Bilirubin  SMALL  (NEGATIVE)  H  12/25/19  14:28    


 


Urine Urobilinogen  1 (NORMAL) E.U./dL (NORMAL)   12/25/19  14:28    


 


Ur Leukocyte Esterase  SMALL  (NEGATIVE)  H  12/25/19  14:28    


 


Urine RBC  0-5 /HPF (0-5)   12/25/19  14:28    


 


Urine WBC  >25 /HPF (0-3)  H  12/25/19  14:28    


 


Ur Squamous Epith Cells  RARE Squamous  (<= Few)   12/25/19  14:28    


 


Amorphous Sediment  Few /LPF  12/25/19  14:28    


 


Urine Bacteria  Few /HPF (None Seen)   12/25/19  14:28    


 


Urine Casts  0-2 Hyaline Casts /LPF  12/25/19  14:28    


 


Ur Microscopic Review  INDICATED   12/25/19  14:28    


 


Urine Culture Comments  INDICATED   12/25/19  14:28    


 


Blood Type  A POSITIVE   12/25/19  16:39    


 


Blood Type Recheck  A POSITIVE   12/25/19  11:53    


 


Antibody Screen  NEGATIVE   12/25/19  16:39

## 2019-12-28 NOTE — CONSULTATION NOTE
Referring Provider


Consult Date: 12/28/19





Chief Complaint





- Chief Complaint


Chief Complaint: Anemia / UGI source likely





History of Present Illness





- Admitted From


Admitted From:: ED





- History Obtained From


Records Reviewed: Yes


History obtained from: Chart / patient / Hospitalist / Family


Exam Limitations: None





- History of Present Illness


HPI Comment/Other: 





Patient is an 89 y/o male who presented to the ED with complain of not feeling 

well for the past 3-4 weeks. He was initially seen in the ED in the beginning of

November for removal of a cruz catheter. He returned 1 week ago because he was 

still not feeling well. He was found to have a UTI and given a 5-day course of 

Cipro. He was also advised to return if there was no improvement thus leading to

his return. On further review it was found that 2 bacteria grew from the urine 

culture E. faecalis and Aerococcus urinae. Aerococcus was not sensitive to 

Cipro. He was also found to have a hemoglobin of 7.7. On October 29th 2019, it 

was 12.6. He denies dark tarry stools, coffee ground emesis or any traumatic 

event from which he would have bled profusely. He last had a colonoscopy 8-9 yrs

ago, during which a polyp was found. He denied chest pain, dyspnea, abd pain, 

n/v/d. He reports back spasm.


He has been diagnosed with bladder cancer and is schedule to follow up with 

[facility] for further treatment.





History





- Past Medical History


Cardiovascular: reports: Hypertension


Neuro: reports: None


GI: reports: GERD


: reports: Other (Prostate cancer, Bladder cancer)


Musculoskeletal: reports: Chronic back pain, Other


MRSA Hx?: No


Other Past Medical History: bladder ca





- Past Surgical History


General: reports: Colonoscopy, Other


Ortho: reports: Arthroscopic surgery, Other





- Family & Social History


Family History: Father: Cancer (lung and brain)


Living arrangement: At home


Social History Notes: He does not smoke or use illicit substances.  He has 1-2 

drinks daily





- POLST


Patient has POLST: No


POLST Status: Full Code





Meds/Allgy





- Home Medications


Home Medications: 


                                Ambulatory Orders











 Medication  Instructions  Recorded  Confirmed


 


Hydrochlorothiazide 12.5 mg PO DAILY 03/04/18 12/27/19


 


Omeprazole 20 mg PO DAILY 03/04/18 12/27/19














- Allergies


Allergies/Adverse Reactions: 


                                    Allergies











Allergy/AdvReac Type Severity Reaction Status Date / Time


 


No Known Drug Allergies Allergy   Verified 12/19/19 13:29














Review of Systems





- Constitutional


Constitutional: reports: Fatigue, Weakness





- Gastrointestinal


Gastrointestinal: reports: Black stools





- Musculoskeletal


Musculoskeletal: reports: Back pain, Other (Admits to taking quite abit of 

Ibuprofen over the past 10 days for his back pain)





Exam





- Vital Signs


Reviewed Vital Signs: Yes


Vital Signs: 





                                Vital Signs x48h











  Temp Pulse Resp BP Pulse Ox


 


 12/28/19 07:55  37.3 C  79  18  134/53 H  92














- Physical Exam


General Appearance: positive: No acute distress, Alert, Other (Daughter and 

daughter in law present)


Eyes Bilateral: positive: Normal inspection


Neck: positive: Nml inspection


Respiratory: positive: Chest non-tender, No respiratory distress, Breath sounds 

nml


Cardiovascular: positive: Regular rate & rhythm, Other (III/VI ALBA)


Abdomen: positive: Non-tender





Conclusion and Plan





- Lab Results





Microbiology Results





12/25/19 14:28   Urine,Clean Catch   Urine Culture - Final


                            NO AEROBIC GROWTH AT 24 HOURS





Laboratory Results





12/28/19 05:55: Sodium 135, Potassium 3.4 L, Chloride 98 L, Carbon Dioxide 29, 

Anion Gap 8.0, BUN 23 H, Creatinine 1.0, Estimated GFR (MDRD) 71 L, Glucose 124 

H, Calcium 7.9 L, Total Bilirubin 0.4, AST 39, ALT 35, Alkaline Phosphatase 75, 

Total Protein 5.8 L, Albumin 1.9 L, Globulin 3.9, Albumin/Globulin Ratio 0.5 L


12/28/19 05:55: WBC 8.9, RBC 2.54 L, Hgb 7.9 L, Hct 24.7 L, MCV 97.2 H, MCH 31.1

H, MCHC 32.0, RDW 12.9, Plt Count 239, MPV 10.2, Neut # (Auto) 7.4 H, Lymph # 

(Auto) 0.7 L, Mono # (Auto) 0.7, Eos # (Auto) 0.0, Baso # (Auto) 0.1, Absolute 

Nucleated RBC 0.00, Nucleated RBC % 0.0


12/27/19 04:20: Sodium 139, Potassium 3.6, Chloride 98 L, Carbon Dioxide 29, 

Anion Gap 12.0, BUN 26 H, Creatinine 1.0, Estimated GFR (MDRD) 71 L, Glucose 134

H, Calcium 8.7, Total Bilirubin 0.4, AST 56 H, ALT 48, Alkaline Phosphatase 98, 

Total Protein 7.1, Albumin 2.3 L, Globulin 4.8 H, Albumin/Globulin Ratio 0.5 L


12/27/19 04:20: WBC 13.2 H, RBC 2.90 L, Hgb 9.1 L, Hct 28.5 L, MCV 98.3 H, MCH 

31.4 H, MCHC 31.9 L, RDW 12.6, Plt Count 287, MPV 10.5, Neut # (Auto) 11.2 H, 

Lymph # (Auto) 1.2 L, Mono # (Auto) 0.7, Eos # (Auto) 0.0, Baso # (Auto) 0.0, 

Absolute Nucleated RBC 0.00, Nucleated RBC % 0.0


12/26/19 15:00: Potassium 3.7











- Diagnosis


Diagnosis: Anemia / melena  UGI bleed presumed





- Plan


Plan: 





Monitor H&H and transfuse per protocol





EGD AM

## 2019-12-29 LAB
ANION GAP SERPL CALCULATED.4IONS-SCNC: 7 MMOL/L (ref 6–13)
BASOPHILS NFR BLD AUTO: 0 10^3/UL (ref 0–0.1)
BASOPHILS NFR BLD AUTO: 0.3 %
BUN SERPL-MCNC: 24 MG/DL (ref 6–20)
CALCIUM UR-MCNC: 7.7 MG/DL (ref 8.5–10.3)
CHLORIDE SERPL-SCNC: 100 MMOL/L (ref 101–111)
CO2 SERPL-SCNC: 29 MMOL/L (ref 21–32)
CREAT SERPLBLD-SCNC: 0.9 MG/DL (ref 0.6–1.2)
EOSINOPHIL # BLD AUTO: 0 10^3/UL (ref 0–0.7)
EOSINOPHIL NFR BLD AUTO: 0.2 %
ERYTHROCYTE [DISTWIDTH] IN BLOOD BY AUTOMATED COUNT: 13.1 % (ref 12–15)
GFRSERPLBLD MDRD-ARVRAT: 80 ML/MIN/{1.73_M2} (ref 89–?)
GLUCOSE SERPL-MCNC: 138 MG/DL (ref 70–100)
HGB UR QL STRIP: 7.4 G/DL (ref 14–18)
INR PPP: 1.7 (ref 0.8–1.2)
LYMPHOCYTES # SPEC AUTO: 0.6 10^3/UL (ref 1.5–3.5)
LYMPHOCYTES NFR BLD AUTO: 6.5 %
MCH RBC QN AUTO: 30.8 PG (ref 27–31)
MCHC RBC AUTO-ENTMCNC: 31.8 G/DL (ref 32–36)
MCV RBC AUTO: 97.1 FL (ref 80–94)
MONOCYTES # BLD AUTO: 0.5 10^3/UL (ref 0–1)
MONOCYTES NFR BLD AUTO: 5.7 %
NEUTROPHILS # BLD AUTO: 7.6 10^3/UL (ref 1.5–6.6)
NEUTROPHILS # SNV AUTO: 8.8 X10^3/UL (ref 4.8–10.8)
NEUTROPHILS NFR BLD AUTO: 86.6 %
PDW BLD AUTO: 10.3 FL (ref 7.4–11.4)
PLATELET # BLD: 237 10^3/UL (ref 130–450)
PROTHROM ACT/NOR PPP: 18.7 SECS (ref 9.9–12.6)
RBC MAR: 2.4 10^6/UL (ref 4.7–6.1)
SODIUM SERPLBLD-SCNC: 136 MMOL/L (ref 135–145)

## 2019-12-29 PROCEDURE — 0DB98ZX EXCISION OF DUODENUM, VIA NATURAL OR ARTIFICIAL OPENING ENDOSCOPIC, DIAGNOSTIC: ICD-10-PCS | Performed by: SURGERY

## 2019-12-29 RX ADMIN — DOCUSATE SODIUM SCH MG: 250 CAPSULE, LIQUID FILLED ORAL at 08:38

## 2019-12-29 RX ADMIN — SODIUM CHLORIDE SCH MLS/HR: 9 INJECTION, SOLUTION INTRAVENOUS at 11:25

## 2019-12-29 RX ADMIN — PANTOPRAZOLE SODIUM SCH MG: 40 TABLET, DELAYED RELEASE ORAL at 06:51

## 2019-12-29 RX ADMIN — SENNOSIDES SCH MG: 8.6 TABLET, FILM COATED ORAL at 08:38

## 2019-12-29 RX ADMIN — FERROUS GLUCONATE SCH MG: 324 TABLET ORAL at 08:38

## 2019-12-29 RX ADMIN — SODIUM CHLORIDE, PRESERVATIVE FREE SCH: 5 INJECTION INTRAVENOUS at 16:43

## 2019-12-29 RX ADMIN — SODIUM CHLORIDE SCH MLS/HR: 9 INJECTION, SOLUTION INTRAVENOUS at 11:30

## 2019-12-29 RX ADMIN — SODIUM CHLORIDE SCH MLS/HR: 900 INJECTION INTRAVENOUS at 08:38

## 2019-12-29 RX ADMIN — PANTOPRAZOLE SODIUM SCH MG: 40 TABLET, DELAYED RELEASE ORAL at 16:39

## 2019-12-29 RX ADMIN — SODIUM SULFATE, POTASSIUM SULFATE, MAGNESIUM SULFATE SCH ML: 1.6; 3.13; 17.5 SOLUTION ORAL at 18:21

## 2019-12-29 RX ADMIN — SODIUM CHLORIDE, PRESERVATIVE FREE SCH ML: 5 INJECTION INTRAVENOUS at 02:53

## 2019-12-29 RX ADMIN — SODIUM CHLORIDE, PRESERVATIVE FREE SCH ML: 5 INJECTION INTRAVENOUS at 08:39

## 2019-12-29 RX ADMIN — Medication SCH MG: at 16:42

## 2019-12-29 RX ADMIN — SODIUM CHLORIDE SCH MLS/HR: 9 INJECTION, SOLUTION INTRAVENOUS at 18:16

## 2019-12-29 RX ADMIN — ACETAMINOPHEN PRN MG: 325 TABLET ORAL at 02:54

## 2019-12-29 RX ADMIN — SODIUM CHLORIDE SCH MLS/HR: 9 INJECTION, SOLUTION INTRAVENOUS at 23:58

## 2019-12-29 NOTE — PROVIDER PROGRESS NOTE
Assessment/Plan





- Problem List


(1) FUO (fever of unknown origin)


Assessment/Plan: 


I spoke to the Infectious Disease attending at  on the webtide phone line 

today and reviewed the entire case including prostate and bladder CA Hx, the 

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 

culture sent and he got Cipro oral treatment, returned with persistent sx and 

his 5-day hospital stay here with continued nightly fever spikes, neg repeat 

urine and blood cx and use of Ceftriaxone since admitted.


The ID attending recommended making sure he did not have influenza symptoms, and

he does not have a cough, N/V or diarrhea.


Also after reviewing with her, the sensitivities of the Enterococcus faecalis 

and Aerococcus urinae, from the positive urine culture of 12/19/2019, she 

recommended not to Ceftriaxone which is a poor choice for Enterococcus, to 

switch to Ampicillin or Ampicillin/Sulbactam or Vanco.  She also recommended 

proceeding to a CT scan of the abdomen and pelvis to look for an abscess.  Since

there were no positive blood cultures, endocarditis would be difficult to pin 

down and there would need to be an Echo (and we do not have Echo available here 

until 1/2/2020).


In reviewing this chart, I noticed there was no chest x-ray done on admission, 

therefore will get a chest x-ray.


Order a CT of the abdomen/pelvis with contrast to look for abscess, plus look at

his spine, since he has had weeks of low back pain.


Will change antibiotic treatment to Amp/Sulbactam 3 g IV every 6 hours.


She stated there was no big utility in getting blood cultures with every fever 

spike, since there are now 3 blood cultures and all have been negative, and he 

has been on antibx.


The above plan was reviewed with the patient and family member at bedside.


After the CT abdomen pelvis was done, I recalled the [facility] 

doctor with the results.  The main findings were new lymph nodes, many 

throughout the abdomen and pelvis that were periaortic, new since August 2019. 

Also a mass in the lower bladder was seen. No areas had abscess, no 

hydronephrosis, no stones, LS-spine looked normal, some srthritic change of the 

R sacro-iliac joint (was an added impression by Mj when I called them back).


I will reach out to Urology regarding these findings and the patient's entire 

course: He follows with Dr. Doyle at [facility], which is part of .


The above was all reviewed with the patient and many family members at bedside.








(2) UTI (urinary tract infection)


Qualifiers: 


   Urinary tract infection type: site unspecified   Hematuria presence: without 

hematuria   Qualified Code(s): N39.0 - Urinary tract infection, site not 

specified   


Assessment/Plan: 


As above: stop Ceftriaxone, scan for abscess, start Unasyn iv.








(3) Anemia


Qualifiers: 


   Anemia type: iron deficiency 


Assessment/Plan: 


Hgb dropped to 7.4.


Continue po Iron.


Continue W/U








(4) GI bleed


Assessment/Plan: 


He was using Ibuprofen for LBP, and his inr is abnormally elevated, possibly 

from alcohol use/abuse.


Vit K 5 mg iv given x1 this a.m, since he will get biopsies during EGD


EGD this morning done by Dr Teague showed a hyperemic nodule at the duodenum 

which was biopsied.


Dr Teague recommended a colonoscopy. A Suprep will be ordered for tonight, 

then NPO after midnight.








(5) Elevated INR


Assessment/Plan: 


His INR is abnormally elevated, possibly from alcohol use/abuse. There have been

no signs of alcohol withdrawal, today is day 5 here.


Vit K 5 mg iv given x1 this a.m, since he will get biopsies during EGD








(6) NIXON (acute kidney injury)


Assessment/Plan: 


The creatinine has improved to normal.  There is still elevated BUN/creatinine 

ratio, suggestive of mild dehydration.


He has not been on HCTZ since admission


Continue with gentle IV hydration, especially with his fever spikes.








(7) Hypertension


Assessment/Plan: 


Blood pressure is normal.


He has not been on HCTZ since admission.








(8) Bladder cancer


Assessment/Plan: 


Bladder CA per history.


The renal ultrasound done today did show a nodule at the right inferior lower 

bladder and cystoscopy was advised by the radiologist.


We have no information about where the bladder cancer was localized to on prior 

evaluation by his Urologist








(9) Hypokalemia


Assessment/Plan: 


Resolved








- Current Meds


Current Meds: 





                               Current Medications











Generic Name Dose Route Start Last Admin





  Trade Name Freq  PRN Reason Stop Dose Admin


 


Acetaminophen  650 mg  12/25/19 17:08  12/29/19 02:54





  Tylenol  PO   650 mg





  Q4HR PRN   Administration





  Pain or Fever > 38C (100.4F)   





     





     





     


 


Cyclobenzaprine HCl  5 mg  12/25/19 19:41  12/25/19 21:37





  Flexeril  PO   5 mg





  TID PRN   Administration





  Spasms   





     





     





     


 


Docusate Sodium  250 - 500 mg  12/26/19 09:00  12/29/19 08:38





  Colace 250mg Capsule  PO   250 mg





  DAILY AMANDA   Administration





     





     





     





     


 


Ferrous Gluconate  324 mg  12/26/19 12:00  12/29/19 08:38





  Fergon  PO   324 mg





  DAILYWM AMANDA   Administration





     





     





     





     


 


Sodium Chloride  1,000 mls @ 0 mls/hr  12/25/19 18:00  12/29/19 08:38





  Normal Saline 0.9%  IV   0 mls/hr





  .Q0M AMANDA   Infusion





     





     





     





  TKO   


 


Pantoprazole Sodium  40 mg  12/28/19 21:00  12/29/19 06:51





  Protonix  PO   40 mg





  BIDAC AMANDA   Administration





     





     





     





     


 


Polyethylene Glycol  17 gm  12/26/19 09:00  12/29/19 08:39





  Miralax  PO   Not Given





  DAILY AMANDA   





     





     





     





     


 


Senna  8.6 - 17.2 mg  12/26/19 09:00  12/29/19 08:38





  Senokot  PO   8.6 mg





  DAILY AMANDA   Administration





     





     





     





     


 


Sodium Chloride  10 ml  12/25/19 17:08  12/25/19 18:48





  Normal Saline Flush 0.9%  IVP   10 ml





  PRN PRN   Administration





  AS NEEDED PER PROVIDER ORDERS   





     





     





     


 


Sodium Chloride  10 ml  12/26/19 01:00  12/29/19 08:39





  Normal Saline Flush 0.9%  IVP   10 ml





  0100,0900,1700 AMANDA   Administration





     





     





     





     














- Lab Result


Fish Bone Diagrams: 


                                 12/29/19 04:25





                                 12/29/19 04:25





- Additional Planning


My Orders: 





My Active Orders





12/28/19 21:00


Pantoprazole [Protonix]   40 mg PO BIDAC 





12/29/19


Social Work Consult [CONS] Routine 





12/29/19 00:01


DIET [NPO except Meds at Midnight] [DIET] 





12/29/19 10:38


ABDOMEN/PELVIS W [CT] Routine 





12/29/19 10:40


Chest 2 View X-Ray [XR] Routine 





12/29/19 12:00


Ampicillin/Sulbactam [Unasyn] 3 gm   Sodium Chloride 0.9% Minibag [Normal Saline

 0.9% Minibag] 100 ml IV Q6HR 





12/29/19 17:00


Saccharomyces Edgarddii [Florastor]   250 mg PO BIDWM 





12/30/19 05:00


BMP - BASIC METABOLIC PANEL [CHEM] DAILYLAB 


CBC - COMP BLD CT W/AUTO DIFF [HEME] DAILYLAB 





12/31/19 05:00


BMP - BASIC METABOLIC PANEL [CHEM] DAILYLAB 


CBC - COMP BLD CT W/AUTO DIFF [HEME] DAILYLAB 














Objective


Vital Signs: 





                               Vital Signs - 24 hr











  12/28/19 12/28/19 12/28/19





  16:00 18:59 20:10


 


Temperature 38.7 C H 37.2 C 36.7 C


 


Heart Rate [ 88  





Brachial]   


 


Respiratory 20  





Rate   


 


Blood Pressure 141/64 H  121/58 L





[Left Brachial   





artery]   


 


O2 Saturation 93  97














  12/29/19 12/29/19 12/29/19





  00:00 02:49 04:19


 


Temperature 37.4 C 38.7 C H 36.8 C


 


Heart Rate [ 89  





Brachial]   


 


Respiratory 18  





Rate   


 


Blood Pressure 154/70 H  





[Left Brachial   





artery]   


 


O2 Saturation 91 L  














  12/29/19





  08:00


 


Temperature 36.9 C


 


Heart Rate [ 70





Brachial] 


 


Respiratory 17





Rate 


 


Blood Pressure 133/60 H





[Left Brachial 





artery] 


 


O2 Saturation 93








                                     Oxygen











O2 Source                      Nasal cannula














I&O (Last 24 Hrs): 





                          Intake and Output Totals x24h











 12/27/19 12/28/19 12/29/19





 23:59 23:59 23:59


 


Intake Total 2381.219 5385.417 116.25


 


Output Total 75  


 


Balance 0473.367 3729.417 116.25














- Results


Results: 





                               Laboratory Results











WBC  8.8 x10^3/uL (4.8-10.8)   12/29/19  04:25    


 


RBC  2.40 10^6/uL (4.70-6.10)  L  12/29/19  04:25    


 


Hgb  7.4 g/dL (14.0-18.0)  L  12/29/19  04:25    


 


Hct  23.3 % (42.0-52.0)  L  12/29/19  04:25    


 


MCV  97.1 fL (80.0-94.0)  H  12/29/19  04:25    


 


MCH  30.8 pg (27.0-31.0)   12/29/19  04:25    


 


MCHC  31.8 g/dL (32.0-36.0)  L  12/29/19  04:25    


 


RDW  13.1 % (12.0-15.0)   12/29/19  04:25    


 


Plt Count  237 10^3/uL (130-450)   12/29/19  04:25    


 


MPV  10.3 fL (7.4-11.4)   12/29/19  04:25    


 


Neut # (Auto)  7.6 10^3/uL (1.5-6.6)  H  12/29/19  04:25    


 


Lymph # (Auto)  0.6 10^3/uL (1.5-3.5)  L  12/29/19  04:25    


 


Mono # (Auto)  0.5 10^3/uL (0.0-1.0)   12/29/19  04:25    


 


Eos # (Auto)  0.0 10^3/uL (0.0-0.7)   12/29/19  04:25    


 


Baso # (Auto)  0.0 10^3/uL (0.0-0.1)   12/29/19  04:25    


 


Absolute Nucleated RBC  0.00 x10^3/uL  12/29/19  04:25    


 


Nucleated RBC %  0.0 /100WBC  12/29/19  04:25    


 


PT  18.7 secs (9.9-12.6)  H  12/29/19  04:25    


 


INR  1.7  (0.8-1.2)  H  12/29/19  04:25    


 


Sodium  136 mmol/L (135-145)   12/29/19  04:25    


 


Potassium  3.6 mmol/L (3.5-5.0)   12/29/19  04:25    


 


Chloride  100 mmol/L (101-111)  L  12/29/19  04:25    


 


Carbon Dioxide  29 mmol/L (21-32)   12/29/19  04:25    


 


Anion Gap  7.0  (6-13)   12/29/19  04:25    


 


BUN  24 mg/dL (6-20)  H  12/29/19  04:25    


 


Creatinine  0.9 mg/dL (0.6-1.2)   12/29/19  04:25    


 


Estimated GFR (MDRD)  80  (>89)  L  12/29/19  04:25    


 


Glucose  138 mg/dL ()  H  12/29/19  04:25    


 


Lactic Acid  1.4 mmol/L (0.5-2.2)   12/25/19  11:53    


 


Calcium  7.7 mg/dL (8.5-10.3)  L  12/29/19  04:25    


 


Magnesium  1.7 mg/dL (1.7-2.8)   12/26/19  04:25    


 


Iron  15 ug/dL ()  L  12/26/19  04:25    


 


TIBC  148 ug/dL (250-450)  L  12/26/19  04:25    


 


% Saturation  10 % (20-50)  L  12/26/19  04:25    


 


Transferrin  106 mg/dL (180-329)  L  12/26/19  04:25    


 


Total Bilirubin  0.4 mg/dL (0.2-1.0)   12/28/19  05:55    


 


AST  39 IU/L (10-42)   12/28/19  05:55    


 


ALT  35 IU/L (10-60)   12/28/19  05:55    


 


Alkaline Phosphatase  75 IU/L ()   12/28/19  05:55    


 


Total Protein  5.8 g/dL (6.7-8.2)  L  12/28/19  05:55    


 


Albumin  1.9 g/dL (3.2-5.5)  L  12/28/19  05:55    


 


Globulin  3.9 g/dL (2.1-4.2)   12/28/19  05:55    


 


Albumin/Globulin Ratio  0.5  (1.0-2.2)  L  12/28/19  05:55    


 


Lipase  35 U/L (22-51)   12/25/19  11:53    


 


Vitamin B12  589 pg/mL (180-914)   12/26/19  04:25    


 


Folate  12.69 ng/mL (5.90 - >24.8)   12/26/19  04:25    


 


Urine Color  YELLOW   12/25/19  14:28    


 


Urine Clarity  CLOUDY  (CLEAR)   12/25/19  14:28    


 


Urine pH  5.0 PH (5.0-7.5)   12/25/19  14:28    


 


Ur Specific Gravity  >=1.030  (1.002-1.030)  H  12/25/19  14:28    


 


Urine Protein  100 mg/dL (NEGATIVE)  H  12/25/19  14:28    


 


Urine Glucose (UA)  NEGATIVE mg/dL (NEGATIVE)   12/25/19  14:28    


 


Urine Ketones  NEGATIVE mg/dL (NEGATIVE)   12/25/19  14:28    


 


Urine Occult Blood  SMALL  (NEGATIVE)  H  12/25/19  14:28    


 


Urine Nitrite  NEGATIVE  (NEGATIVE)   12/25/19  14:28    


 


Urine Bilirubin  SMALL  (NEGATIVE)  H  12/25/19  14:28    


 


Urine Urobilinogen  1 (NORMAL) E.U./dL (NORMAL)   12/25/19  14:28    


 


Ur Leukocyte Esterase  SMALL  (NEGATIVE)  H  12/25/19  14:28    


 


Urine RBC  0-5 /HPF (0-5)   12/25/19  14:28    


 


Urine WBC  >25 /HPF (0-3)  H  12/25/19  14:28    


 


Ur Squamous Epith Cells  RARE Squamous  (<= Few)   12/25/19  14:28    


 


Amorphous Sediment  Few /LPF  12/25/19  14:28    


 


Urine Bacteria  Few /HPF (None Seen)   12/25/19  14:28    


 


Urine Casts  0-2 Hyaline Casts /LPF  12/25/19  14:28    


 


Ur Microscopic Review  INDICATED   12/25/19  14:28    


 


Urine Culture Comments  INDICATED   12/25/19  14:28    


 


Blood Type  A POSITIVE   12/25/19  16:39    


 


Blood Type Recheck  A POSITIVE   12/25/19  11:53    


 


Antibody Screen  NEGATIVE   12/25/19  16:39

## 2019-12-29 NOTE — CT REPORT
Reason:  fevers, UTI, low back pain, eval for abscess

Procedure Date:  12/29/2019   

Accession Number:  [id number] / [id number]                    

Procedure:  CT  - Abdomen/Pelvis W CPT Code:  

 

***Final Report***

 

 

FULL RESULT:

 

 

EXAM:

CT ABDOMEN AND PELVIS WITH CONTRAST

 

EXAM DATE: 12/29/2019 02:04 PM.

 

CLINICAL HISTORY: Fevers, UTI and low back pain in an 88-year-old male. 

Evaluate for abscess.

 

COMPARISONS: CT IVP 08/28/2019 9:34 AM.  CT IVP 05/07/2018 9:36 AM.

 

TECHNIQUE: Routine helical CT imaging was performed through the abdomen 

and pelvis. IV contrast: 90 mL OPTI 320. Enteric contrast: Yes. 

Reconstructions: Coronal and sagittal.

 

In accordance with CT protocol optimization, one or more of the following 

dose reduction techniques were utilized for this exam: automated exposure 

control, adjustment of mA and/or KV based on patient size, or use of 

iterative reconstructive technique.

 

FINDINGS:

Lung Bases: Small bilateral pleural effusions, slightly greater on the 

left, new from earlier studies. Moderate sized hiatus hernia again noted. 

Patchy atelectasis both lung bases.

 

11 mm nodule left lung base laterally on scan 3-9, similar to most recent 

prior study.

 

Liver: Decreased attenuation consistent with mild fatty infiltration, as 

previous. No mass or cyst.

 

Gallbladder/Bile Ducts: No gallstones, wall thickening or dilated bile 

ducts.

 

Spleen: Normal.

 

Pancreas: Normal.

 

Adrenal Glands: Normal.

 

Right kidney: Normal. No masses, nephrolithiasis, hydroureter or 

hydronephrosis.

 

Left kidney: Prominent kidney cyst midportion laterally 8.3 x 7.1 cm, 

slightly larger than previously. Additional smaller inferior pole medial 

cyst 1.5 cm in diameter, previously 1.8 cm. No solid mass, 

nephrolithiasis, hydronephrosis or hydroureter.

 

Peritoneal Cavity/Bowel: No free fluid or free air. Multiple new small to 

moderate sized lymph nodes surrounding the aorta, the largest 2.8 cm in 

maximum diameter on scan 3-51. No masses or acute inflammatory process. 

The appendix is well visualized and normal.

 

Pelvic Organs: Question of echogenic fluid versus mass in the urinary 

bladder, new from prior study, possibly related to contrast injection. No 

bladder wall thickening or obstruction additional small lymph nodes 

bilaterally, new from prior study.

 

Vasculature: No aneurysms or other significant abnormality.

 

Bones: Unremarkable for age. No acute process or metastatic disease.

 

Other: None.

IMPRESSION: Small bilateral pleural effusions, left greater than right 

with question of infiltrates versus atelectasis both lung bases.

 

Stable 11 mm nodule left lung base laterally.

 

Multiple lymph nodes surrounding the aorta and iliac arteries extending 

down from the mid abdomen to the pelvis, largest 2.8 cm in diameter, all 

new from prior study of 08/28/2019.

 

Normal right kidney. Stable prominent left renal cyst 8.3 cm in diameter.

 

Questionable echogenic fluid versus possible mass within the urinary 

bladder, new from prior studies.

 

[facility]

## 2019-12-29 NOTE — XRAY REPORT
Reason:  fever and chills

Procedure Date:  12/29/2019   

Accession Number:  [id number] / [id number]                    

Procedure:  XR  - Chest 2 View X-Ray CPT Code:  14933

 

***Final Report***

 

 

FULL RESULT:

 

 

EXAM:

CHEST RADIOGRAPHY

 

EXAM DATE: 12/29/2019 02:07 PM.

 

CLINICAL HISTORY: Fever and chills.

 

COMPARISON:  03/21/2010 8:34 PM.

 

TECHNIQUE: 2 views.

 

FINDINGS:

 

Lungs/Pleura: Tiny nodular opacities and minimal interstitial prominence 

are seen in both upper lung zones.

 

Bibasilar atelectases and scarring noted.

 

No pleural effusion. No pneumothorax. Normal volumes.

 

Mediastinum: Heart and mediastinal contours are unremarkable.

 

Other: None.

IMPRESSION: Tiny nodular densities and minimal interstitial prominence in 

both upper lung zones. Differentials include viral/atypical pneumonia 

versus inflammatory pneumonitis.

 

[facility]

## 2019-12-29 NOTE — ULTRASOUND REPORT
Reason:  Fever, Gram neg UTI, prostate CA hx

Procedure Date:  12/29/2019   

Accession Number:  [id number] / [id number]                    

Procedure:  US  - Retroperitoneal CPT Code:  

 

***Addended Final Report***

 

 

FULL RESULT:

 

 

EXAM:

RENAL ULTRASOUND

 

EXAM DATE: 12/29/2019 07:25 AM.

 

CLINICAL HISTORY: Fever, Gram neg UTI, prostate CA hx.

 

COMPARISON: CT IVP 08/28/2019 9:34 AM.

 

TECHNIQUE: Real-time scanning was performed with static images obtained.

 

FINDINGS:

Right Kidney: 11.0 x 6.1 x 6.0 cm. Normal echotexture with no stones, 

contour-deforming masses, or hydronephrosis.

 

Left Kidney: 14.3 x 5.5 x 5.9 cm. Normal echotexture with no stones, 

contour-deforming masses, or hydronephrosis. There is a simple cortical 

cyst at the inferior/medial aspect of the kidney measuring 8.8 x 7.6 x 

6.2 cm, as seen on prior CT IVP. There is a simple cortical cyst at the 

inferior/lateral aspect of the left kidney measuring 2.1 x 1.8 x 1.0 cm, 

as seen on prior CT IVP.

 

Bladder: Bilateral ureteral jets were not seen. The prevoid bladder 

volume was 34.3 cc. The postvoid bladder volume was 30.8 cc. The urinary 

bladder wall appears mildly thickened measuring up to 5 mm, although the 

bladder is not completely distended. There is a heterogeneous 

solid-appearing focus with internal vascularity located at the 

posterior/inferior right urinary bladder. This measures approximately 2.1 

x 1.8 x 1.4 cm. There are punctate echogenic foci in the partially 

visualized prostate, consistent with brachytherapy seeds seen on prior CT.

 

Other: None.

 

IMPRESSION:

1. No hydronephrosis bilaterally.

2. Simple-appearing left renal cortical cysts, as seen on prior CT IVP.

3. Possible mild bladder wall thickening, although the bladder is not 

completely distended on prevoid images.

4. Solid-appearing focus with internal vascular flow at the posterior 

inferior right urinary bladder. This may represent an exophytic nodular 

component of the prostate or possible bladder mass. Recommend further 

evaluation with cystoscopy when clinically appropriate.

 

[facility]

 

The call report notification system was initiated by Dr. Artem Minaya 

at 10:02 AM on 12/29/2019.

ADDENDUM: 12/29/19 10:09

 

The above call report findings  were discussed with Adriane Aguilar by Dr. Artem Minaya at 10:09 AM on 12/29/2019.

## 2019-12-29 NOTE — ANESTHESIA
Pre-Anesthesia VS, & Labs





- Diagnosis





                                        





Diagnosis                        Anemia / melena  UGI bleed presumed











- Procedure





EGD


Vital Signs: 





                                        











Temp Pulse Resp BP Pulse Ox


 


 36.9 C   70   17   133/60 H  93 


 


 12/29/19 08:00  12/29/19 08:00  12/29/19 08:00  12/29/19 08:00  12/29/19 08:00














                                        





Height                           5 ft 10 in


Weight (kg)                      81.5 kg


Body Mass Index                  26.5











- Lab Results


Current Lab Results: 





Laboratory Tests





12/29/19 04:25: PT 18.7 H, INR 1.7 H


12/29/19 04:25: Sodium 136, Potassium 3.6, Chloride 100 L, Carbon Dioxide 29, 

Anion Gap 7.0, BUN 24 H, Creatinine 0.9, Estimated GFR (MDRD) 80 L, Glucose 138 

H, Calcium 7.7 L


12/29/19 04:25: WBC 8.8, RBC 2.40 L, Hgb 7.4 L, Hct 23.3 L, MCV 97.1 H, MCH 

30.8, MCHC 31.8 L, RDW 13.1, Plt Count 237, MPV 10.3, Neut # (Auto) 7.6 H, Lymph

# (Auto) 0.6 L, Mono # (Auto) 0.5, Eos # (Auto) 0.0, Baso # (Auto) 0.0, Absolute

Nucleated RBC 0.00, Nucleated RBC % 0.0


12/28/19 16:45: Hgb 8.5 L, Hct 26.7 L


12/28/19 05:55: Sodium 135, Potassium 3.4 L, Chloride 98 L, Carbon Dioxide 29, 

Anion Gap 8.0, BUN 23 H, Creatinine 1.0, Estimated GFR (MDRD) 71 L, Glucose 124 

H, Calcium 7.9 L, Total Bilirubin 0.4, AST 39, ALT 35, Alkaline Phosphatase 75, 

Total Protein 5.8 L, Albumin 1.9 L, Globulin 3.9, Albumin/Globulin Ratio 0.5 L


12/28/19 05:55: WBC 8.9, RBC 2.54 L, Hgb 7.9 L, Hct 24.7 L, MCV 97.2 H, MCH 31.1

H, MCHC 32.0, RDW 12.9, Plt Count 239, MPV 10.2, Neut # (Auto) 7.4 H, Lymph # 

(Auto) 0.7 L, Mono # (Auto) 0.7, Eos # (Auto) 0.0, Baso # (Auto) 0.1, Absolute 

Nucleated RBC 0.00, Nucleated RBC % 0.0


12/27/19 04:20: Sodium 139, Potassium 3.6, Chloride 98 L, Carbon Dioxide 29, 

Anion Gap 12.0, BUN 26 H, Creatinine 1.0, Estimated GFR (MDRD) 71 L, Glucose 134

H, Calcium 8.7, Total Bilirubin 0.4, AST 56 H, ALT 48, Alkaline Phosphatase 98, 

Total Protein 7.1, Albumin 2.3 L, Globulin 4.8 H, Albumin/Globulin Ratio 0.5 L


12/27/19 04:20: WBC 13.2 H, RBC 2.90 L, Hgb 9.1 L, Hct 28.5 L, MCV 98.3 H, MCH 

31.4 H, MCHC 31.9 L, RDW 12.6, Plt Count 287, MPV 10.5, Neut # (Auto) 11.2 H, 

Lymph # (Auto) 1.2 L, Mono # (Auto) 0.7, Eos # (Auto) 0.0, Baso # (Auto) 0.0, 

Absolute Nucleated RBC 0.00, Nucleated RBC % 0.0


12/26/19 15:00: PT 19.6 H, INR 1.8 H


12/26/19 15:00: Potassium 3.7


12/26/19 04:25: Vitamin B12 589, Folate 12.69


12/26/19 04:25: Sodium 135, Potassium 3.1 L, Chloride 96 L, Carbon Dioxide 29, 

Anion Gap 10.0, BUN 27 H, Creatinine 1.2, Estimated GFR (MDRD) 57 L, Glucose 120

H, Calcium 8.0 L, Magnesium 1.7, Iron 15 L, TIBC 148 L, % Saturation 10 L, 

Transferrin 106 L, Total Bilirubin 0.5, AST 45 H, ALT 40, Alkaline Phosphatase 

73, Total Protein 6.2 L, Albumin 2.2 L, Globulin 4.0, Albumin/Globulin Ratio 0.6

L


12/26/19 04:25: WBC 9.0, RBC 2.54 L, Hgb 7.9 L, Hct 24.9 L, MCV 98.0 H, MCH 31.1

H, MCHC 31.7 L, RDW 12.6, Plt Count 256, MPV 10.5, Neut # (Auto) 7.4 H, Lymph # 

(Auto) 0.8 L, Mono # (Auto) 0.7, Eos # (Auto) 0.0, Baso # (Auto) 0.0, Absolute 

Nucleated RBC 0.00, Nucleated RBC % 0.0


12/25/19 16:39: Blood Type A POSITIVE, Antibody Screen NEGATIVE


12/25/19 11:53: Blood Type Recheck A POSITIVE


12/25/19 11:53: Lactic Acid 1.4


12/25/19 11:53: Sodium 138, Potassium 3.0 L, Chloride 99 L, Carbon Dioxide 29, 

Anion Gap 10.0, BUN 30 H, Creatinine 1.4 H, Estimated GFR (MDRD) 48 L, Glucose 

134 H, Calcium 8.4 L, Total Bilirubin 0.4, AST 52 H, ALT 41, Alkaline 

Phosphatase 71, Total Protein 6.2 L, Albumin 2.2 L, Globulin 4.0, 

Albumin/Globulin Ratio 0.6 L, Lipase 35


12/25/19 11:53: WBC 7.4, RBC 2.47 L, Hgb 7.7 L, Hct 24.5 L, MCV 99.2 H, MCH 31.2

H, MCHC 31.4 L, RDW 12.5, Plt Count 254, MPV 10.3, Neut # (Auto) 6.0, Lymph # 

(Auto) 0.7 L, Mono # (Auto) 0.7, Eos # (Auto) 0.0, Baso # (Auto) 0.0, Absolute 

Nucleated RBC 0.00, Nucleated RBC % 0.0








Fish Bones: 


                                 12/29/19 04:25





                                 12/29/19 04:25





Home Medications and Allergies





Active Medications





Acetaminophen (Tylenol)  650 mg PO Q4HR PRN


   PRN Reason: Pain or Fever > 38C (100.4F)


   Last Admin: 12/29/19 02:54 Dose:  650 mg


Cyclobenzaprine HCl (Flexeril)  5 mg PO TID PRN


   PRN Reason: Spasms


   Last Admin: 12/25/19 21:37 Dose:  5 mg


Docusate Sodium (Colace 250mg Capsule)  250 - 500 mg PO DAILY [facility]


   Last Admin: 12/29/19 08:38 Dose:  250 mg


Ferrous Gluconate (Fergon)  324 mg PO DAILYWM [facility]


   Last Admin: 12/29/19 08:38 Dose:  324 mg


Hydralazine HCl (Apresoline Inj)  10 mg IVP Q6H PRN


   PRN Reason: PER PHYSICIAN ORDER


Sodium Chloride (Normal Saline 0.9%)  1,000 mls @ 0 mls/hr IV .Q0M [facility]


   Last Infusion: 12/29/19 08:38 Dose:  0 mls/hr


Ceftriaxone Sodium 2 gm/ (Sodium Chloride)  100 mls @ 200 mls/hr IV DAILY [facility]


   Last Infusion: 12/29/19 09:08 Dose:  Infused


Ondansetron HCl (Zofran Inj)  4 mg IVP Q6HR PRN


   PRN Reason: Nausea / Vomiting


Pantoprazole Sodium (Protonix)  40 mg PO BIDAC [facility]


   Last Admin: 12/29/19 06:51 Dose:  40 mg


Polyethylene Glycol (Miralax)  17 gm PO DAILY [facility]


   Last Admin: 12/29/19 08:39 Dose:  Not Given


Senna (Senokot)  8.6 - 17.2 mg PO DAILY [facility]


   Last Admin: 12/29/19 08:38 Dose:  8.6 mg


Sodium Chloride (Normal Saline Flush 0.9%)  10 ml IVP PRN PRN


   PRN Reason: AS NEEDED PER PROVIDER ORDERS


   Last Admin: 12/25/19 18:48 Dose:  10 ml


Sodium Chloride (Normal Saline Flush 0.9%)  10 ml IVP 0100,0900,1700 [facility]


   Last Admin: 12/29/19 08:39 Dose:  10 ml





                                        





Hydrochlorothiazide 12.5 mg PO DAILY 03/04/18 


Omeprazole 20 mg PO DAILY 03/04/18 








Allergies/Adverse Reactions: 


                                    Allergies











Allergy/AdvReac Type Severity Reaction Status Date / Time


 


No Known Drug Allergies Allergy   Verified 12/19/19 13:29














Anes History & Medical History





- Anesthetic History


Anesthesia Complications: reports: No previous complications


Family history of Anesthesia Complications: Denies


Family history of Malignant Hyperthermia: Denies





- Medical History


Cardiovascular: reports: Hypertension


Pulmonary: reports: None


Gastrointestinal: reports: GERD


Urinary: reports: Other (Prostate cancer, Bladder cancer)


Neuro: reports: None


Musculoskeletal: reports: Chronic back pain, Other


Endocrine/Autoimmune: reports: None


Blood Disorders: reports: None


Smoking Status: Never smoker


Psychosocial: reports: Alcohol ("1 to 2 drinks a day")


Other Past Medical History: bladder ca





- Surgical History


General: Colonoscopy, Other (left ankle surgery 8 years ago)


Urologic: Bladder surgery


Orthopedic: Arthroscopic surgery, Other





Exam


General: Alert, Oriented x3, Cooperative, No acute distress


Dental: WNL


Mouth Opening: 3 Fingerbreadth


Neck Mobility: Normal


Mallampati classification: II


Thyromental Distance: 4-6 cm


Respiratory: Lungs clear, Normal breath sounds, No respiratory distress, No 

accessory muscle use


Cardiovascular: Regular rate, Normal S1, Normal S2, No murmurs


Abdomen: Normal bowel sounds, Soft, No tenderness, No hepatospenomegaly, No 

masses


Extremities: No clubbing, No cyanosis, No edema, Normal pulses, No 

tenderness/swelling


Neurological: Normal gait, Normal speech, Strength at 5/5 X4 ext, Normal tone, 

Sensation intact, Cranial nerves 3-12 NL, Reflexes 2+


Mental/Cognitive Status: Alert/Oriented X3, Normal for patient


Cognitive Status: Within normal limits





Plan


Anesthesia Type: MAC


Consent for Procedure(s) Verified and Reviewed: Yes


Code Status: Attempt Resuscitation


ASA classification: 3-Severe systemic disease


Is this case an emergency?: Yes

## 2019-12-30 LAB
ANION GAP SERPL CALCULATED.4IONS-SCNC: 11 MMOL/L (ref 6–13)
BASOPHILS NFR BLD AUTO: 0 10^3/UL (ref 0–0.1)
BASOPHILS NFR BLD AUTO: 0.3 %
BUN SERPL-MCNC: 24 MG/DL (ref 6–20)
CALCIUM UR-MCNC: 8 MG/DL (ref 8.5–10.3)
CHLORIDE SERPL-SCNC: 101 MMOL/L (ref 101–111)
CO2 SERPL-SCNC: 28 MMOL/L (ref 21–32)
CREAT SERPLBLD-SCNC: 1 MG/DL (ref 0.6–1.2)
EOSINOPHIL # BLD AUTO: 0 10^3/UL (ref 0–0.7)
EOSINOPHIL NFR BLD AUTO: 0.1 %
ERYTHROCYTE [DISTWIDTH] IN BLOOD BY AUTOMATED COUNT: 13.2 % (ref 12–15)
GFRSERPLBLD MDRD-ARVRAT: 71 ML/MIN/{1.73_M2} (ref 89–?)
GLUCOSE SERPL-MCNC: 137 MG/DL (ref 70–100)
HGB UR QL STRIP: 7.8 G/DL (ref 14–18)
LYMPHOCYTES # SPEC AUTO: 0.7 10^3/UL (ref 1.5–3.5)
LYMPHOCYTES NFR BLD AUTO: 7.9 %
MCH RBC QN AUTO: 30.5 PG (ref 27–31)
MCHC RBC AUTO-ENTMCNC: 31.2 G/DL (ref 32–36)
MCV RBC AUTO: 97.7 FL (ref 80–94)
MONOCYTES # BLD AUTO: 0.5 10^3/UL (ref 0–1)
MONOCYTES NFR BLD AUTO: 5.6 %
NEUTROPHILS # BLD AUTO: 7.7 10^3/UL (ref 1.5–6.6)
NEUTROPHILS # SNV AUTO: 9 X10^3/UL (ref 4.8–10.8)
NEUTROPHILS NFR BLD AUTO: 85.5 %
PDW BLD AUTO: 10.1 FL (ref 7.4–11.4)
PLATELET # BLD: 257 10^3/UL (ref 130–450)
RBC MAR: 2.56 10^6/UL (ref 4.7–6.1)
SODIUM SERPLBLD-SCNC: 140 MMOL/L (ref 135–145)

## 2019-12-30 PROCEDURE — 0DBK8ZZ EXCISION OF ASCENDING COLON, VIA NATURAL OR ARTIFICIAL OPENING ENDOSCOPIC: ICD-10-PCS | Performed by: SURGERY

## 2019-12-30 PROCEDURE — 0DBL8ZZ EXCISION OF TRANSVERSE COLON, VIA NATURAL OR ARTIFICIAL OPENING ENDOSCOPIC: ICD-10-PCS | Performed by: SURGERY

## 2019-12-30 RX ADMIN — FERROUS GLUCONATE SCH MG: 324 TABLET ORAL at 09:11

## 2019-12-30 RX ADMIN — ACETAMINOPHEN PRN MG: 325 TABLET ORAL at 23:55

## 2019-12-30 RX ADMIN — SODIUM CHLORIDE SCH MLS/HR: 9 INJECTION, SOLUTION INTRAVENOUS at 05:57

## 2019-12-30 RX ADMIN — Medication SCH MG: at 09:11

## 2019-12-30 RX ADMIN — SODIUM CHLORIDE, PRESERVATIVE FREE SCH: 5 INJECTION INTRAVENOUS at 23:39

## 2019-12-30 RX ADMIN — SODIUM CHLORIDE SCH MLS/HR: 9 INJECTION, SOLUTION INTRAVENOUS at 11:20

## 2019-12-30 RX ADMIN — SENNOSIDES SCH: 8.6 TABLET, FILM COATED ORAL at 09:07

## 2019-12-30 RX ADMIN — SODIUM CHLORIDE SCH MLS/HR: 9 INJECTION, SOLUTION INTRAVENOUS at 18:13

## 2019-12-30 RX ADMIN — PANTOPRAZOLE SODIUM SCH MG: 40 TABLET, DELAYED RELEASE ORAL at 06:49

## 2019-12-30 RX ADMIN — SODIUM CHLORIDE SCH MLS/HR: 9 INJECTION, SOLUTION INTRAVENOUS at 23:38

## 2019-12-30 RX ADMIN — PANTOPRAZOLE SODIUM SCH MG: 40 TABLET, DELAYED RELEASE ORAL at 17:10

## 2019-12-30 RX ADMIN — SODIUM CHLORIDE, PRESERVATIVE FREE SCH: 5 INJECTION INTRAVENOUS at 17:11

## 2019-12-30 RX ADMIN — SODIUM SULFATE, POTASSIUM SULFATE, MAGNESIUM SULFATE SCH ML: 1.6; 3.13; 17.5 SOLUTION ORAL at 05:04

## 2019-12-30 RX ADMIN — SODIUM CHLORIDE, PRESERVATIVE FREE SCH: 5 INJECTION INTRAVENOUS at 09:07

## 2019-12-30 RX ADMIN — SODIUM CHLORIDE, PRESERVATIVE FREE SCH ML: 5 INJECTION INTRAVENOUS at 05:09

## 2019-12-30 RX ADMIN — DOCUSATE SODIUM SCH: 250 CAPSULE, LIQUID FILLED ORAL at 09:07

## 2019-12-30 RX ADMIN — Medication SCH MG: at 17:10

## 2019-12-30 NOTE — CONSULTATION NOTE
Consultation Report: 





Patient seen by anesthesia yesterday, continues to have a GI bleed, no other 

changes.  Will do MAC foe colonscopy.

## 2019-12-30 NOTE — PROCEDURE REPORT
Hospitalist Procedure Note





- Procedure Note


Procedure Note: 





Colonoscopy: 4 polyps; resected; no active bleeding was evident and no obvious 

source of major gi bleeding was identified.

## 2019-12-30 NOTE — PROVIDER PROGRESS NOTE
Assessment/Plan





- Problem List


(1) FUO (fever of unknown origin)


Assessment/Plan: 


There was only a low-grade fever overnight.


No new positive culture results.


I reached out to the physician's assistant of  and reviewed the urology 

concerns including lymph nodes on CT scan.  She thought the lymph nodes were 

related to his cancer, advised continuation with current antibiotics and 

management, and that the patient will have an office visit with  before 

the January 4, 2020 repeat bladder surgery that is planned.


We will plan 48 hours of iv antibx continue from the time of the last fever spik

e then transition to oral medications at which time he could be discharged home.


I reported all this to the patient and daughter-in-law at the bedside.








(2) UTI (urinary tract infection)


Qualifiers: 


   Urinary tract infection type: site unspecified   Hematuria presence: without 

hematuria   Qualified Code(s): N39.0 - Urinary tract infection, site not 

specified   


Assessment/Plan: 


As above.








(3) Anemia


Qualifiers: 


   Anemia type: iron deficiency 


Assessment/Plan: 


Hemoglobin has been stable at 7-8 since admission.


The EGD showed no active bleeding but hyperemic nodule which was biopsied and 

today the colonoscopy showed 4 polyps which were not bleeding and were biopsied.


He probably had bleeding related to Motrin overuse, that has now stopped








(4) GI bleed


Assessment/Plan: 


The guiac of stool was (+).


Plan as above.








(5) Elevated INR


Assessment/Plan: 


Probably related to nutrition and daily alcohol use.  He did receive vitamin K o

nce before the first biopsy.


INR has decreased from 1.8 to 1.7 to 1.5








(6) NIXON (acute kidney injury)


Assessment/Plan: 


BUN/creatinine has now been 24/1.0 for the past 3 days, prerenal azotemia 

consistent with volume depletion is suspected.


The creatinine is now normal








(7) Hypertension


Assessment/Plan: 


Stable with current management.








(8) Bladder cancer


Assessment/Plan: 


I reached out to his Urologist, Dr. Hue Ryan, and spoke to her nurse there, 

who read the last note and stated that his entire case had been handed over to 

.   had done a bladder mass biopsy in Oct with findings of 

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 

resection on January 4, 2020 as well as IV BCG treatment in the future.


I then reached out to the office of Dr Doyle. The PA of Dr. Doyle spoke to me by 

phone. Dr Doyle was out of town. We reviewed his entire hospital course.  The PA 

will contact Dr Doyle, discuss the case and the patient's CT scan results. The PA

thought the new lymph nodes are likely related to his cancer and that there will

probably be an office visit before the January 4, 2020 bladder surgery in case 

there will be a different plan.  The PA requested that CT images be pushed to [facility] (this was ordered to be done). The PA agreed with our management 

and did not recommend that the patient be transferred for higher level of care.


I reported these conversations to the patient and daughter-in-law in the room, 

who said she would update the rest of the family.








(9) Hypokalemia


Assessment/Plan: 


Resolved








- Current Meds


Current Meds: 





                               Current Medications











Generic Name Dose Route Start Last Admin





  Trade Name Freq  PRN Reason Stop Dose Admin


 


Acetaminophen  650 mg  12/25/19 17:08  12/29/19 02:54





  Tylenol  PO   650 mg





  Q4HR PRN   Administration





  Pain or Fever > 38C (100.4F)   





     





     





     


 


Cyclobenzaprine HCl  5 mg  12/25/19 19:41  12/25/19 21:37





  Flexeril  PO   5 mg





  TID PRN   Administration





  Spasms   





     





     





     


 


Docusate Sodium  250 - 500 mg  12/26/19 09:00  12/30/19 09:07





  Colace 250mg Capsule  PO   Not Given





  DAILY AMANDA   





     





     





     





     


 


Ferrous Gluconate  324 mg  12/26/19 12:00  12/30/19 09:11





  Fergon  PO   324 mg





  DAILYWM AMANDA   Administration





     





     





     





     


 


Sodium Chloride  1,000 mls @ 0 mls/hr  12/25/19 18:00  12/30/19 11:20





  Normal Saline 0.9%  IV   10 mls/hr





  .Q0M AMANDA   Administration





     





     





     





  TKO   


 


Ampicillin Sodium/Sulbactam  100 mls @ 200 mls/hr  12/29/19 12:00  12/30/19 

11:50





  Sodium 3 gm/ Sodium Chloride  IV   Infused





  Q6HR AMANDA   Infusion





     





     





     





     


 


Pantoprazole Sodium  40 mg  12/28/19 21:00  12/30/19 06:49





  Protonix  PO   40 mg





  BIDAC AMANDA   Administration





     





     





     





     


 


Polyethylene Glycol  17 gm  12/26/19 09:00  12/30/19 09:07





  Miralax  PO   Not Given





  DAILY AMANDA   





     





     





     





     


 


Saccharomyces Boulardii  250 mg  12/29/19 17:00  12/30/19 09:11





  Florastor  PO   250 mg





  BIDWM AMANDA   Administration





     





     





     





     


 


Senna  8.6 - 17.2 mg  12/26/19 09:00  12/30/19 09:07





  Senokot  PO   Not Given





  DAILY AMANDA   





     





     





     





     


 


Sodium Chloride  10 ml  12/25/19 17:08  12/25/19 18:48





  Normal Saline Flush 0.9%  IVP   10 ml





  PRN PRN   Administration





  AS NEEDED PER PROVIDER ORDERS   





     





     





     


 


Sodium Chloride  10 ml  12/26/19 01:00  12/30/19 09:07





  Normal Saline Flush 0.9%  IVP   Not Given





  0100,0900,1700 AMANDA   





     





     





     





     














- Lab Result


Fish Bone Diagrams: 


                                 12/31/19 05:52





                                 12/31/19 05:55





- Additional Planning


My Orders: 





My Active Orders





12/29/19 17:00


Saccharomyces Boulardii [Florastor]   250 mg PO BIDWM 





12/30/19


Evaluate and Treat OT [OT] Routine 


Evaluate and Treat PT [PT] Routine 





12/30/19 Lunch


DIET [Regular Diet] [DIET] 





12/31/19 05:00


BMP - BASIC METABOLIC PANEL [CHEM] DAILYLAB 


CBC - COMP BLD CT W/AUTO DIFF [HEME] DAILYLAB 














Subjective





- Subjective


Patient Reports: Resting Comfortably





Objective


Vital Signs: 





                               Vital Signs - 24 hr











  12/29/19 12/30/19 12/30/19





  23:33 07:57 10:35


 


Temperature 37.6 C H 37.4 C 36.6 C


 


Heart Rate   76


 


Heart Rate [ 80 82 





Brachial]   


 


Respiratory 20 16 20





Rate   


 


Blood Pressure   98/58 L


 


Blood Pressure 115/54 L 138/61 H 





[Right Brachial   





artery]   


 


O2 Saturation 93 94 97














  12/30/19 12/30/19 12/30/19





  10:40 10:45 10:50


 


Temperature   


 


Heart Rate 73 70 73


 


Heart Rate [   





Brachial]   


 


Respiratory 19 16 19





Rate   


 


Blood Pressure 110/53 L 110/50 L 109/56 L


 


Blood Pressure   





[Right Brachial   





artery]   


 


O2 Saturation 96 98 96














  12/30/19 12/30/19 12/30/19





  10:55 11:03 11:11


 


Temperature 36.5 C 36.6 C 36.7 C


 


Heart Rate 73 76 


 


Heart Rate [   77





Brachial]   


 


Respiratory 20 21 16





Rate   


 


Blood Pressure 109/60 120/52 L 


 


Blood Pressure   121/59 L





[Right Brachial   





artery]   


 


O2 Saturation 100 98 100








                                     Oxygen











O2 Source                      Nasal cannula














I&O (Last 24 Hrs): 





                          Intake and Output Totals x24h











 12/28/19 12/29/19 12/30/19





 23:59 23:59 23:59


 


Intake Total 2665.417 1652.75 792.667


 


Balance 2665.417 1652.75 792.667











General: Alert, Oriented x3, Other (Appears tired.)


HEENT: Mucous membr. moist/pink


Neck: Supple


Neuro: Alert, Non Focal


Cardiovascular: Regular rate


Respiratory: No respiratory distress


Abdomen: Soft


Extremities: No edema





- Results


Results: 





                               Laboratory Results











WBC  9.0 x10^3/uL (4.8-10.8)   12/30/19  05:51    


 


RBC  2.56 10^6/uL (4.70-6.10)  L  12/30/19  05:51    


 


Hgb  7.8 g/dL (14.0-18.0)  L  12/30/19  05:51    


 


Hct  25.0 % (42.0-52.0)  L  12/30/19  05:51    


 


MCV  97.7 fL (80.0-94.0)  H  12/30/19  05:51    


 


MCH  30.5 pg (27.0-31.0)   12/30/19  05:51    


 


MCHC  31.2 g/dL (32.0-36.0)  L  12/30/19  05:51    


 


RDW  13.2 % (12.0-15.0)   12/30/19  05:51    


 


Plt Count  257 10^3/uL (130-450)   12/30/19  05:51    


 


MPV  10.1 fL (7.4-11.4)   12/30/19  05:51    


 


Neut # (Auto)  7.7 10^3/uL (1.5-6.6)  H  12/30/19  05:51    


 


Lymph # (Auto)  0.7 10^3/uL (1.5-3.5)  L  12/30/19  05:51    


 


Mono # (Auto)  0.5 10^3/uL (0.0-1.0)   12/30/19  05:51    


 


Eos # (Auto)  0.0 10^3/uL (0.0-0.7)   12/30/19  05:51    


 


Baso # (Auto)  0.0 10^3/uL (0.0-0.1)   12/30/19  05:51    


 


Absolute Nucleated RBC  0.00 x10^3/uL  12/30/19  05:51    


 


Nucleated RBC %  0.0 /100WBC  12/30/19  05:51    


 


PT  18.7 secs (9.9-12.6)  H  12/29/19  04:25    


 


INR  1.7  (0.8-1.2)  H  12/29/19  04:25    


 


Whole Blood INR  1.5  (0.8-1.2)  H  12/30/19  05:51    


 


Sodium  140 mmol/L (135-145)   12/30/19  05:51    


 


Potassium  3.5 mmol/L (3.5-5.0)   12/30/19  05:51    


 


Chloride  101 mmol/L (101-111)   12/30/19  05:51    


 


Carbon Dioxide  28 mmol/L (21-32)   12/30/19  05:51    


 


Anion Gap  11.0  (6-13)   12/30/19  05:51    


 


BUN  24 mg/dL (6-20)  H  12/30/19  05:51    


 


Creatinine  1.0 mg/dL (0.6-1.2)   12/30/19  05:51    


 


Estimated GFR (MDRD)  71  (>89)  L  12/30/19  05:51    


 


Glucose  137 mg/dL ()  H  12/30/19  05:51    


 


Lactic Acid  1.4 mmol/L (0.5-2.2)   12/25/19  11:53    


 


Calcium  8.0 mg/dL (8.5-10.3)  L  12/30/19  05:51    


 


Magnesium  1.7 mg/dL (1.7-2.8)   12/26/19  04:25    


 


Iron  15 ug/dL ()  L  12/26/19  04:25    


 


TIBC  148 ug/dL (250-450)  L  12/26/19  04:25    


 


% Saturation  10 % (20-50)  L  12/26/19  04:25    


 


Transferrin  106 mg/dL (180-329)  L  12/26/19  04:25    


 


Total Bilirubin  0.4 mg/dL (0.2-1.0)   12/28/19  05:55    


 


AST  39 IU/L (10-42)   12/28/19  05:55    


 


ALT  35 IU/L (10-60)   12/28/19  05:55    


 


Alkaline Phosphatase  75 IU/L ()   12/28/19  05:55    


 


Total Protein  5.8 g/dL (6.7-8.2)  L  12/28/19  05:55    


 


Albumin  1.9 g/dL (3.2-5.5)  L  12/28/19  05:55    


 


Globulin  3.9 g/dL (2.1-4.2)   12/28/19  05:55    


 


Albumin/Globulin Ratio  0.5  (1.0-2.2)  L  12/28/19  05:55    


 


Lipase  35 U/L (22-51)   12/25/19  11:53    


 


Vitamin B12  589 pg/mL (180-914)   12/26/19  04:25    


 


Folate  12.69 ng/mL (5.90 - >24.8)   12/26/19  04:25    


 


Urine Color  YELLOW   12/25/19  14:28    


 


Urine Clarity  CLOUDY  (CLEAR)   12/25/19  14:28    


 


Urine pH  5.0 PH (5.0-7.5)   12/25/19  14:28    


 


Ur Specific Gravity  >=1.030  (1.002-1.030)  H  12/25/19  14:28    


 


Urine Protein  100 mg/dL (NEGATIVE)  H  12/25/19  14:28    


 


Urine Glucose (UA)  NEGATIVE mg/dL (NEGATIVE)   12/25/19  14:28    


 


Urine Ketones  NEGATIVE mg/dL (NEGATIVE)   12/25/19  14:28    


 


Urine Occult Blood  SMALL  (NEGATIVE)  H  12/25/19  14:28    


 


Urine Nitrite  NEGATIVE  (NEGATIVE)   12/25/19  14:28    


 


Urine Bilirubin  SMALL  (NEGATIVE)  H  12/25/19  14:28    


 


Urine Urobilinogen  1 (NORMAL) E.U./dL (NORMAL)   12/25/19  14:28    


 


Ur Leukocyte Esterase  SMALL  (NEGATIVE)  H  12/25/19  14:28    


 


Urine RBC  0-5 /HPF (0-5)   12/25/19  14:28    


 


Urine WBC  >25 /HPF (0-3)  H  12/25/19  14:28    


 


Ur Squamous Epith Cells  RARE Squamous  (<= Few)   12/25/19  14:28    


 


Amorphous Sediment  Few /LPF  12/25/19  14:28    


 


Urine Bacteria  Few /HPF (None Seen)   12/25/19  14:28    


 


Urine Casts  0-2 Hyaline Casts /LPF  12/25/19  14:28    


 


Ur Microscopic Review  INDICATED   12/25/19  14:28    


 


Urine Culture Comments  INDICATED   12/25/19  14:28    


 


Blood Type  A POSITIVE   12/25/19  16:39    


 


Blood Type Recheck  A POSITIVE   12/25/19  11:53    


 


Antibody Screen  NEGATIVE   12/25/19  16:39

## 2019-12-31 LAB
ALBUMIN DIAFP-MCNC: 1.9 G/DL (ref 3.2–5.5)
ALP SERPL-CCNC: 83 IU/L (ref 42–121)
ALT SERPL W P-5'-P-CCNC: 68 IU/L (ref 10–60)
ANION GAP SERPL CALCULATED.4IONS-SCNC: 7 MMOL/L (ref 6–13)
AST SERPL W P-5'-P-CCNC: 76 IU/L (ref 10–42)
BASOPHILS NFR BLD AUTO: 0 10^3/UL (ref 0–0.1)
BASOPHILS NFR BLD AUTO: 0.5 %
BILIRUB BLD-MCNC: 0.9 MG/DL (ref 0.2–1)
BILIRUB DIRECT SERPL-MCNC: 0.2 MG/DL (ref 0.1–0.5)
BUN SERPL-MCNC: 21 MG/DL (ref 6–20)
CALCIUM UR-MCNC: 7.6 MG/DL (ref 8.5–10.3)
CHLORIDE SERPL-SCNC: 104 MMOL/L (ref 101–111)
CO2 SERPL-SCNC: 29 MMOL/L (ref 21–32)
CREAT SERPLBLD-SCNC: 0.9 MG/DL (ref 0.6–1.2)
EOSINOPHIL # BLD AUTO: 0 10^3/UL (ref 0–0.7)
EOSINOPHIL NFR BLD AUTO: 0.1 %
ERYTHROCYTE [DISTWIDTH] IN BLOOD BY AUTOMATED COUNT: 13.7 % (ref 12–15)
GFRSERPLBLD MDRD-ARVRAT: 80 ML/MIN/{1.73_M2} (ref 89–?)
GLOBULIN SER-MCNC: 4 G/DL (ref 2.1–4.2)
GLUCOSE SERPL-MCNC: 141 MG/DL (ref 70–100)
HGB UR QL STRIP: 7.3 G/DL (ref 14–18)
LYMPHOCYTES # SPEC AUTO: 0.7 10^3/UL (ref 1.5–3.5)
LYMPHOCYTES NFR BLD AUTO: 7.7 %
MCH RBC QN AUTO: 30.9 PG (ref 27–31)
MCHC RBC AUTO-ENTMCNC: 30.9 G/DL (ref 32–36)
MCV RBC AUTO: 100 FL (ref 80–94)
MONOCYTES # BLD AUTO: 0.5 10^3/UL (ref 0–1)
MONOCYTES NFR BLD AUTO: 5.7 %
NEUTROPHILS # BLD AUTO: 7.4 10^3/UL (ref 1.5–6.6)
NEUTROPHILS # SNV AUTO: 8.7 X10^3/UL (ref 4.8–10.8)
NEUTROPHILS NFR BLD AUTO: 85.4 %
PDW BLD AUTO: 9.7 FL (ref 7.4–11.4)
PLATELET # BLD: 239 10^3/UL (ref 130–450)
PROT SPEC-MCNC: 5.9 G/DL (ref 6.7–8.2)
RBC MAR: 2.36 10^6/UL (ref 4.7–6.1)
SODIUM SERPLBLD-SCNC: 140 MMOL/L (ref 135–145)

## 2019-12-31 RX ADMIN — POTASSIUM CHLORIDE SCH MLS/HR: 7.46 INJECTION, SOLUTION INTRAVENOUS at 10:05

## 2019-12-31 RX ADMIN — POTASSIUM CHLORIDE SCH MLS/HR: 7.46 INJECTION, SOLUTION INTRAVENOUS at 08:45

## 2019-12-31 RX ADMIN — SODIUM CHLORIDE SCH MLS/HR: 9 INJECTION, SOLUTION INTRAVENOUS at 18:34

## 2019-12-31 RX ADMIN — Medication SCH MG: at 08:45

## 2019-12-31 RX ADMIN — SODIUM CHLORIDE SCH MLS/HR: 9 INJECTION, SOLUTION INTRAVENOUS at 12:11

## 2019-12-31 RX ADMIN — DOCUSATE SODIUM SCH MG: 250 CAPSULE, LIQUID FILLED ORAL at 08:45

## 2019-12-31 RX ADMIN — ACETAMINOPHEN PRN MG: 325 TABLET ORAL at 16:28

## 2019-12-31 RX ADMIN — FERROUS GLUCONATE SCH MG: 324 TABLET ORAL at 08:45

## 2019-12-31 RX ADMIN — PANTOPRAZOLE SODIUM SCH MG: 40 TABLET, DELAYED RELEASE ORAL at 05:53

## 2019-12-31 RX ADMIN — SODIUM CHLORIDE, PRESERVATIVE FREE SCH: 5 INJECTION INTRAVENOUS at 09:46

## 2019-12-31 RX ADMIN — PANTOPRAZOLE SODIUM SCH MG: 40 TABLET, DELAYED RELEASE ORAL at 16:28

## 2019-12-31 RX ADMIN — SODIUM CHLORIDE SCH MLS/HR: 9 INJECTION, SOLUTION INTRAVENOUS at 05:53

## 2019-12-31 RX ADMIN — SODIUM CHLORIDE, PRESERVATIVE FREE SCH ML: 5 INJECTION INTRAVENOUS at 16:29

## 2019-12-31 RX ADMIN — Medication SCH MG: at 16:29

## 2019-12-31 RX ADMIN — POTASSIUM CHLORIDE SCH MLS/HR: 7.46 INJECTION, SOLUTION INTRAVENOUS at 10:57

## 2019-12-31 RX ADMIN — SENNOSIDES SCH MG: 8.6 TABLET, FILM COATED ORAL at 08:45

## 2019-12-31 NOTE — XRAY REPORT
Reason:  Desaturatios, fever, eval for pneumonia

Procedure Date:  12/31/2019   

Accession Number:  [id number] / [id number]                    

Procedure:  XR  - Chest 1 View X-Ray CPT Code:  16675

 

***Final Report***

 

 

FULL RESULT:

 

 

EXAM:

CHEST RADIOGRAPHY

 

EXAM DATE: 12/31/2019 07:52 AM.

 

CLINICAL HISTORY: Desaturations, fever, eval for pneumonia.

 

COMPARISON: CHEST 2 VIEW 12/29/2019 2:05 PM.

 

TECHNIQUE: 1 view.

 

FINDINGS:

Lungs/Pleura: Lung volumes are decreased compared to prior. Mild 

pulmonary vascular congestion is increased. There is increased density of 

mild patchy opacities at the bilateral lung bases. The previously seen 

subtle small nodular opacities in the bilateral upper lung zones are not 

apparent on this exam. No pleural effusion or pneumothorax.

 

Mediastinum: There is stable borderline enlargement of the cardiac 

silhouette. There is mild atherosclerotic calcification of the aortic 

arch.

 

Other: No acute osseous abnormality.

 

IMPRESSION:

1. Mild pulmonary vascular congestion appears increased compared to prior.

2. Lung volumes are low, decreased compared to prior. There is increased 

mild patchy atelectasis or infiltrate at the bilateral lung bases.

3. The previously seen small subtle nodular densities in the bilateral 

upper lung zones are not apparent on this exam.

 

[facility]

## 2019-12-31 NOTE — PROVIDER PROGRESS NOTE
Assessment/Plan





- Problem List


(1) FUO (fever of unknown origin)


Assessment/Plan: 


There was yet another fever spike to 38 degrees C last night. The WBC is 

decreasing from 13 when Unasyn was started to 8.9 and 8.8


He has an appetite, no pain, no rhinorrhea, or cough.


Chest x-ray was obtained this morning to look for consolidation, this showed 

mild vascular congestion and atelectasis.


Incentive spirometry was reordered.


I called the [facility] line at  and again spoke to infectious disease and 

reviewed the entire course.  The infectious disease specialist recommended that 

he be checked for influenza a and B and he recommended to continue with this 

present antibiotic which appears to have improved his situation slightly and 

when he is afebrile to switch him to amoxicillin 875 mg p.o. every 12 hours for 

10-day total course from the time the Unasyn was started.


The above was discussed with the patient and daughter-in-law who is in the room








(2) UTI (urinary tract infection)


Qualifiers: 


   Urinary tract infection type: site unspecified   Hematuria presence: without 

hematuria   Qualified Code(s): N39.0 - Urinary tract infection, site not 

specified   


Assessment/Plan: 


Continue with plan as above








(3) Anemia


Qualifiers: 


   Anemia type: iron deficiency 


Assessment/Plan: 


EGD negative for bleeding but a hyperemic nodule was biopsied.  Colonoscopy 

negative for bleeding but some polyps were biopsied.


Hemoglobin has been between 7 and 8.


He is quite fatigued but no transfusions have been given since there are no othe

r severe symptoms.


He has been on daily p.o. iron since admission, this was discussed with the 

daughter-in-law in the room


Continue to follow CBC daily








(4) GI bleed


Assessment/Plan: 


No source was found.


We are presuming that his excessive use of Motrin had caused an upper GI source 

of bleeding which has now stopped, since Hgb has been stable after transfusins 

given at admission.








(5) Elevated INR


Assessment/Plan: 


Presumably due to poor nutrition since he has low serum albumin and protein as 

well.








(6) NIXON (acute kidney injury)


Assessment/Plan: 


Continues to have prerenal azotemia values.


IV fluids were stopped today because of the mild vascular congestion on chest x-

ray.


Follow BMP daily








(7) Hypertension


Assessment/Plan: 


Stable BP on current meds








(8) Bladder cancer


Assessment/Plan: 


There was extensive discussion with Dr Doyle's PA yesterday, which I had repeated

for the patiemt and daughter-in-law. There will be a need for outpatient urology

visit with Dr. Doyle, before the January 14 planned bladder surgery, the 

daughter-in-law is planning to make an appointment for next week, she told me.








(9) Hypokalemia


Assessment/Plan: 


Resolved








- Current Meds


Current Meds: 





                               Current Medications











Generic Name Dose Route Start Last Admin





  Trade Name Freq  PRN Reason Stop Dose Admin


 


Acetaminophen  650 mg  12/25/19 17:08  12/30/19 23:55





  Tylenol  PO   650 mg





  Q4HR PRN   Administration





  Pain or Fever > 38C (100.4F)   





     





     





     


 


Cyclobenzaprine HCl  5 mg  12/25/19 19:41  12/25/19 21:37





  Flexeril  PO   5 mg





  TID PRN   Administration





  Spasms   





     





     





     


 


Docusate Sodium  250 - 500 mg  12/26/19 09:00  12/31/19 08:45





  Colace 250mg Capsule  PO   250 mg





  DAILY AMANDA   Administration





     





     





     





     


 


Ferrous Gluconate  324 mg  12/26/19 12:00  12/31/19 08:45





  Fergon  PO   324 mg





  DAILYWM AMANDA   Administration





     





     





     





     


 


Ampicillin Sodium/Sulbactam  100 mls @ 200 mls/hr  12/29/19 12:00  12/31/19 

12:37





  Sodium 3 gm/ Sodium Chloride  IV   Infused





  Q6HR AMANDA   Infusion





     





     





     





     


 


Pantoprazole Sodium  40 mg  12/28/19 21:00  12/31/19 05:53





  Protonix  PO   40 mg





  BIDAC AMANDA   Administration





     





     





     





     


 


Polyethylene Glycol  17 gm  12/26/19 09:00  12/31/19 09:30





  Miralax  PO   17 gm





  DAILY AMANDA   Administration





     





     





     





     


 


Saccharomyces Boulardii  250 mg  12/29/19 17:00  12/31/19 08:45





  Florastor  PO   250 mg





  BIDWM AMANDA   Administration





     





     





     





     


 


Senna  8.6 - 17.2 mg  12/26/19 09:00  12/31/19 08:45





  Senokot  PO   8.6 mg





  DAILY AMANDA   Administration





     





     





     





     


 


Sodium Chloride  10 ml  12/25/19 17:08  12/25/19 18:48





  Normal Saline Flush 0.9%  IVP   10 ml





  PRN PRN   Administration





  AS NEEDED PER PROVIDER ORDERS   





     





     





     


 


Sodium Chloride  10 ml  12/26/19 01:00  12/31/19 09:46





  Normal Saline Flush 0.9%  IVP   Not Given





  0100,0900,1700 AMANDA   





     





     





     





     














- Lab Result


Fish Bone Diagrams: 


                                 12/31/19 05:52





                                 12/31/19 05:55





- Additional Planning


My Orders: 





My Active Orders





01/01/20 05:00


CBC - COMP BLD CT W/AUTO DIFF [HEME] DAILYLAB 


CMP [COMPREHENSIVE METABOLIC PANEL] [CHEM] DAILYLAB 





01/02/20 05:00


CBC - COMP BLD CT W/AUTO DIFF [HEME] DAILYLAB 


CMP [COMPREHENSIVE METABOLIC PANEL] [CHEM] DAILYLAB 





01/03/20 05:00


CBC - COMP BLD CT W/AUTO DIFF [HEME] DAILYLAB 


CMP [COMPREHENSIVE METABOLIC PANEL] [CHEM] DAILYLAB 





12/31/19 08:45


IS [Incentive Spirometry - RT] [RC] TID 














Subjective





- Subjective


Patient Reports: Resting Comfortably, No Complaints


Nursing Reports: Other (He had energy to work with PT and OT today.)





Objective


Vital Signs: 





                               Vital Signs - 24 hr











  12/30/19 12/30/19 12/31/19





  23:45 23:50 00:41


 


Temperature  38 C H 36.9 C


 


Heart Rate [  83 





Brachial]   


 


Heart Rate [   





Supine]   


 


Respiratory  20 18





Rate   


 


Blood Pressure  140/76 H 





[Right Brachial   





artery]   


 


Blood Pressure   





[Supine]   


 


O2 Saturation 84 L 93 97














  12/31/19 12/31/19 12/31/19





  07:56 09:47 10:20


 


Temperature 37.5 C  


 


Heart Rate [ 80  





Brachial]   


 


Heart Rate [   78





Supine]   


 


Respiratory 16  





Rate   


 


Blood Pressure 142/63 H  





[Right Brachial   





artery]   


 


Blood Pressure   133/61 H





[Supine]   


 


O2 Saturation 93 93 














  12/31/19





  15:38


 


Temperature 37.8 C H


 


Heart Rate [ 80





Brachial] 


 


Heart Rate [ 





Supine] 


 


Respiratory 18





Rate 


 


Blood Pressure 147/61 H





[Right Brachial 





artery] 


 


Blood Pressure 





[Supine] 


 


O2 Saturation 95








                                     Oxygen











O2 Source                      Room air














I&O (Last 24 Hrs): 





                          Intake and Output Totals x24h











 12/29/19 12/30/19 12/31/19





 23:59 23:59 23:59


 


Intake Total 1652.75 2213.275 1627


 


Output Total  50 


 


Balance 1652.75 0553.479 8197











General: Alert, Other (Pale and fatigued)





- Results


Results: 





                               Laboratory Results











WBC  8.7 x10^3/uL (4.8-10.8)   12/31/19  05:52    


 


RBC  2.36 10^6/uL (4.70-6.10)  L  12/31/19  05:52    


 


Hgb  7.3 g/dL (14.0-18.0)  L  12/31/19  05:52    


 


Hct  23.6 % (42.0-52.0)  L  12/31/19  05:52    


 


MCV  100.0 fL (80.0-94.0)  H  12/31/19  05:52    


 


MCH  30.9 pg (27.0-31.0)   12/31/19  05:52    


 


MCHC  30.9 g/dL (32.0-36.0)  L  12/31/19  05:52    


 


RDW  13.7 % (12.0-15.0)   12/31/19  05:52    


 


Plt Count  239 10^3/uL (130-450)   12/31/19  05:52    


 


MPV  9.7 fL (7.4-11.4)   12/31/19  05:52    


 


Neut # (Auto)  7.4 10^3/uL (1.5-6.6)  H  12/31/19  05:52    


 


Lymph # (Auto)  0.7 10^3/uL (1.5-3.5)  L  12/31/19  05:52    


 


Mono # (Auto)  0.5 10^3/uL (0.0-1.0)   12/31/19  05:52    


 


Eos # (Auto)  0.0 10^3/uL (0.0-0.7)   12/31/19  05:52    


 


Baso # (Auto)  0.0 10^3/uL (0.0-0.1)   12/31/19  05:52    


 


Absolute Nucleated RBC  0.00 x10^3/uL  12/31/19  05:52    


 


Nucleated RBC %  0.0 /100WBC  12/31/19  05:52    


 


PT  18.7 secs (9.9-12.6)  H  12/29/19  04:25    


 


INR  1.7  (0.8-1.2)  H  12/29/19  04:25    


 


Whole Blood INR  1.5  (0.8-1.2)  H  12/30/19  05:51    


 


Sodium  140 mmol/L (135-145)   12/31/19  05:55    


 


Potassium  3.4 mmol/L (3.5-5.0)  L  12/31/19  05:55    


 


Chloride  104 mmol/L (101-111)   12/31/19  05:55    


 


Carbon Dioxide  29 mmol/L (21-32)   12/31/19  05:55    


 


Anion Gap  7.0  (6-13)   12/31/19  05:55    


 


BUN  21 mg/dL (6-20)  H  12/31/19  05:55    


 


Creatinine  0.9 mg/dL (0.6-1.2)   12/31/19  05:55    


 


Estimated GFR (MDRD)  80  (>89)  L  12/31/19  05:55    


 


Glucose  141 mg/dL ()  H  12/31/19  05:55    


 


Lactic Acid  1.4 mmol/L (0.5-2.2)   12/25/19  11:53    


 


Calcium  7.6 mg/dL (8.5-10.3)  L  12/31/19  05:55    


 


Magnesium  1.7 mg/dL (1.7-2.8)   12/26/19  04:25    


 


Iron  15 ug/dL ()  L  12/26/19  04:25    


 


TIBC  148 ug/dL (250-450)  L  12/26/19  04:25    


 


% Saturation  10 % (20-50)  L  12/26/19  04:25    


 


Transferrin  106 mg/dL (180-329)  L  12/26/19  04:25    


 


Total Bilirubin  0.9 mg/dL (0.2-1.0)   12/31/19  05:53    


 


Direct Bilirubin  0.2 mg/dL (0.1-0.5)   12/31/19  05:53    


 


AST  76 IU/L (10-42)  H  12/31/19  05:53    


 


ALT  68 IU/L (10-60)  H  12/31/19  05:53    


 


Alkaline Phosphatase  83 IU/L ()   12/31/19  05:53    


 


Total Protein  5.9 g/dL (6.7-8.2)  L  12/31/19  05:53    


 


Albumin  1.9 g/dL (3.2-5.5)  L  12/31/19  05:53    


 


Globulin  4.0 g/dL (2.1-4.2)   12/31/19  05:53    


 


Albumin/Globulin Ratio  0.5  (1.0-2.2)  L  12/28/19  05:55    


 


Lipase  35 U/L (22-51)   12/25/19  11:53    


 


Vitamin B12  589 pg/mL (180-914)   12/26/19  04:25    


 


Folate  12.69 ng/mL (5.90 - >24.8)   12/26/19  04:25    


 


Urine Color  YELLOW   12/25/19  14:28    


 


Urine Clarity  CLOUDY  (CLEAR)   12/25/19  14:28    


 


Urine pH  5.0 PH (5.0-7.5)   12/25/19  14:28    


 


Ur Specific Gravity  >=1.030  (1.002-1.030)  H  12/25/19  14:28    


 


Urine Protein  100 mg/dL (NEGATIVE)  H  12/25/19  14:28    


 


Urine Glucose (UA)  NEGATIVE mg/dL (NEGATIVE)   12/25/19  14:28    


 


Urine Ketones  NEGATIVE mg/dL (NEGATIVE)   12/25/19  14:28    


 


Urine Occult Blood  SMALL  (NEGATIVE)  H  12/25/19  14:28    


 


Urine Nitrite  NEGATIVE  (NEGATIVE)   12/25/19  14:28    


 


Urine Bilirubin  SMALL  (NEGATIVE)  H  12/25/19  14:28    


 


Urine Urobilinogen  1 (NORMAL) E.U./dL (NORMAL)   12/25/19  14:28    


 


Ur Leukocyte Esterase  SMALL  (NEGATIVE)  H  12/25/19  14:28    


 


Urine RBC  0-5 /HPF (0-5)   12/25/19  14:28    


 


Urine WBC  >25 /HPF (0-3)  H  12/25/19  14:28    


 


Ur Squamous Epith Cells  RARE Squamous  (<= Few)   12/25/19  14:28    


 


Amorphous Sediment  Few /LPF  12/25/19  14:28    


 


Urine Bacteria  Few /HPF (None Seen)   12/25/19  14:28    


 


Urine Casts  0-2 Hyaline Casts /LPF  12/25/19  14:28    


 


Ur Microscopic Review  INDICATED   12/25/19  14:28    


 


Urine Culture Comments  INDICATED   12/25/19  14:28    


 


Blood Type  A POSITIVE   12/25/19  16:39    


 


Blood Type Recheck  A POSITIVE   12/25/19  11:53    


 


Antibody Screen  NEGATIVE   12/25/19  16:39

## 2020-01-01 LAB
ALBUMIN DIAFP-MCNC: 1.8 G/DL (ref 3.2–5.5)
ALBUMIN/GLOB SERPL: 0.5 {RATIO} (ref 1–2.2)
ALP SERPL-CCNC: 81 IU/L (ref 42–121)
ALT SERPL W P-5'-P-CCNC: 69 IU/L (ref 10–60)
ANION GAP SERPL CALCULATED.4IONS-SCNC: 8 MMOL/L (ref 6–13)
AST SERPL W P-5'-P-CCNC: 80 IU/L (ref 10–42)
BASOPHILS NFR BLD AUTO: 0 10^3/UL (ref 0–0.1)
BASOPHILS NFR BLD AUTO: 0.4 %
BILIRUB BLD-MCNC: 0.6 MG/DL (ref 0.2–1)
BUN SERPL-MCNC: 20 MG/DL (ref 6–20)
CALCIUM UR-MCNC: 7.5 MG/DL (ref 8.5–10.3)
CHLORIDE SERPL-SCNC: 101 MMOL/L (ref 101–111)
CO2 SERPL-SCNC: 28 MMOL/L (ref 21–32)
CREAT SERPLBLD-SCNC: 0.9 MG/DL (ref 0.6–1.2)
EOSINOPHIL # BLD AUTO: 0 10^3/UL (ref 0–0.7)
EOSINOPHIL NFR BLD AUTO: 0.1 %
ERYTHROCYTE [DISTWIDTH] IN BLOOD BY AUTOMATED COUNT: 13.7 % (ref 12–15)
GFRSERPLBLD MDRD-ARVRAT: 80 ML/MIN/{1.73_M2} (ref 89–?)
GLOBULIN SER-MCNC: 3.9 G/DL (ref 2.1–4.2)
GLUCOSE SERPL-MCNC: 129 MG/DL (ref 70–100)
HGB UR QL STRIP: 6.6 G/DL (ref 14–18)
HGB UR QL STRIP: 7.1 G/DL (ref 14–18)
LYMPHOCYTES # SPEC AUTO: 0.7 10^3/UL (ref 1.5–3.5)
LYMPHOCYTES NFR BLD AUTO: 8.9 %
MANUAL DIF COMMENT BLD-IMP: (no result)
MCH RBC QN AUTO: 30.7 PG (ref 27–31)
MCHC RBC AUTO-ENTMCNC: 31.6 G/DL (ref 32–36)
MCV RBC AUTO: 97.2 FL (ref 80–94)
MONOCYTES # BLD AUTO: 0.5 10^3/UL (ref 0–1)
MONOCYTES NFR BLD AUTO: 6 %
NEUTROPHILS # BLD AUTO: 6.6 10^3/UL (ref 1.5–6.6)
NEUTROPHILS # SNV AUTO: 7.9 X10^3/UL (ref 4.8–10.8)
NEUTROPHILS NFR BLD AUTO: 84.1 %
PDW BLD AUTO: 9.9 FL (ref 7.4–11.4)
PLATELET # BLD: 239 10^3/UL (ref 130–450)
PLATELET BLD QL SMEAR: (no result)
PROT SPEC-MCNC: 5.7 G/DL (ref 6.7–8.2)
RBC MAR: 2.15 10^6/UL (ref 4.7–6.1)
RBC MORPH BLD: (no result)
SODIUM SERPLBLD-SCNC: 137 MMOL/L (ref 135–145)

## 2020-01-01 RX ADMIN — PANTOPRAZOLE SODIUM SCH MG: 40 TABLET, DELAYED RELEASE ORAL at 16:55

## 2020-01-01 RX ADMIN — SODIUM CHLORIDE, PRESERVATIVE FREE SCH ML: 5 INJECTION INTRAVENOUS at 01:04

## 2020-01-01 RX ADMIN — Medication SCH MG: at 16:55

## 2020-01-01 RX ADMIN — SODIUM CHLORIDE, PRESERVATIVE FREE SCH ML: 5 INJECTION INTRAVENOUS at 16:55

## 2020-01-01 RX ADMIN — SODIUM CHLORIDE SCH MLS/HR: 9 INJECTION, SOLUTION INTRAVENOUS at 05:23

## 2020-01-01 RX ADMIN — ACETAMINOPHEN PRN MG: 325 TABLET ORAL at 16:55

## 2020-01-01 RX ADMIN — SODIUM CHLORIDE, PRESERVATIVE FREE SCH: 5 INJECTION INTRAVENOUS at 09:18

## 2020-01-01 RX ADMIN — SODIUM CHLORIDE SCH MLS/HR: 9 INJECTION, SOLUTION INTRAVENOUS at 01:04

## 2020-01-01 RX ADMIN — FERROUS GLUCONATE SCH MG: 324 TABLET ORAL at 09:18

## 2020-01-01 RX ADMIN — Medication SCH MG: at 09:18

## 2020-01-01 RX ADMIN — DOCUSATE SODIUM SCH MG: 250 CAPSULE, LIQUID FILLED ORAL at 09:18

## 2020-01-01 RX ADMIN — SODIUM CHLORIDE SCH MLS/HR: 9 INJECTION, SOLUTION INTRAVENOUS at 18:18

## 2020-01-01 RX ADMIN — SENNOSIDES SCH MG: 8.6 TABLET, FILM COATED ORAL at 09:18

## 2020-01-01 RX ADMIN — PANTOPRAZOLE SODIUM SCH MG: 40 TABLET, DELAYED RELEASE ORAL at 06:06

## 2020-01-01 RX ADMIN — SODIUM CHLORIDE SCH MLS/HR: 9 INJECTION, SOLUTION INTRAVENOUS at 13:52

## 2020-01-01 NOTE — XRAY REPORT
Reason:  Hypoxia, eval for CHF

Procedure Date:  01/01/2020   

Accession Number:  [id number] / [id number]                    

Procedure:  XR  - Chest 1 View X-Ray CPT Code:  00417

 

***Final Report***

 

 

FULL RESULT:

 

 

EXAM:

CHEST RADIOGRAPHY

 

EXAM DATE: 1/1/2020 08:34 AM.

 

CLINICAL HISTORY: Hypoxia, eval for CHF.

 

COMPARISON: CHEST 1 VIEW 12/31/2019 7:52 AM

CHEST 2 VIEW 12/29/2019 2:05 PM.

 

TECHNIQUE: 1 view.

 

FINDINGS:

Lungs/Pleura: Patchy opacities bilateral lung bases, stable. No pleural 

effusion. No pneumothorax.

 

Mediastinum: Borderline cardiomegaly. Mild pulmonary vascular congestion, 

stable.

 

Other: None.

 

IMPRESSION: Mild pulmonary vasculature congestion and patchy opacities at 

lung bases, stable when compared to 12/31/19.

 

[facility]

## 2020-01-01 NOTE — PROVIDER PROGRESS NOTE
Assessment/Plan





- Problem List


(1) FUO (fever of unknown origin)


Assessment/Plan: 


Patient again had a spike of a high fever to 39.1 at 1500 this afternoon. Prior 

to this, most of his fever spikes were at midnight, after the first days 

prolonged fever which lasted 6 hours or so.


This morning's chest x-ray did not show an infiltrate.  The CT scan of the 

abdomen several days ago had the enlarged and new lymph nodes but no abscesses 

or other problems.


I have now spoken to 2 Infectious Disease specialists from [facility] on the [employer] phone line and reviewed his case in 

detail including the type of antibiotics and management.


Since he by definition has FUO, I offered the patient to be transferred to 

[facility] where specialists are in the building 

and the patient is agreeable to transfer.


I will reach out for getting him transferred to a higher level of care hospital,

preferably [facility], where his Urologist Dr Doyle sees him.








(2) UTI (urinary tract infection)


Qualifiers: 


   Urinary tract infection type: site unspecified   Hematuria presence: without 

hematuria   Qualified Code(s): N39.0 - Urinary tract infection, site not 

specified   


Assessment/Plan: 


The positive urine culture from before this hospitalization grew out 

Enterococcus and Aerococcus, the Aerococcuas was not sensitive to Cipro, 

therefore is  was felt he was undertreated, as the cause of fever and chills and

required this admission.  He was first put on iv Ceftriaxone for 2 days and had 

continued fevers and a rising white count, he was then changed to Unasyn which 

have given improvement in white blood count.


No urine or blood cultures are positive from this admission.








(3) Hypoxia


Assessment/Plan: 


His O2 dropped several days ago. Yesterday, the CXR showed mild fluid overload. 

The saline going at [facility] was stopped, and 1 dose of Lasix iv was given.


Today he still is hypoxic on room air.


Will recheck CXR and a BNP


Will obtain Echo in a.m. (the Echo service is not available today since it is a 

holiday 1/1/20).








(4) Elevated LFTs


Assessment/Plan: 


The presumed cause is passive liver congestion, since bilirubin is normal, CT 

abdomen showed no pancreas, GB or acute liver findings and he is about 10L 

positive  in fluid balance since admission.


saline hydration at [facility] was stopped yesterday and he got iv lasix yesterday and 

today.


Follow LFTs.








(5) Anemia


Qualifiers: 


   Anemia type: iron deficiency 


Assessment/Plan: 


Hemoglobin dropped to below 7 at 6.6 and he did get 1 unit of blood transfused 

today. Hemodilution with 10 L positive fluid status is partly the cause.


Follow CBC daily 12 hours and every morning.


He already has completed a work-up for GI blood loss anemia except for small 

bowel evaluation.  There are no black or bloody stools.


Continue daily oral iron replacement








(6) GI bleed


Assessment/Plan: 


Heme (+) stools documented at admission, EGD and colonoscopy this admission 

showed no active bleeding.








(7) Elevated INR


Assessment/Plan: 


This was a sign of his abnormal liver function at admission








(8) Hypertension


Assessment/Plan: 


Stable or mildly elevated BP on current meds.


His home med for HTN was only HCTZ.


here he has gotten iv Lasix fr the past 2 days.


Will start Amlodipine for BP control.








(9) Bladder cancer


Assessment/Plan: 


I have spoken to his primary urologist, Dr. Hue Ryan's office who said that 

they turned over the case to Dr. Doyle.  I spoke to the PA at Dr. Doyle's office 

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 

there is a plan for an outpatient office visit before a scheduled repeat bladder

surgery on January 14








(10) Hypokalemia


Assessment/Plan: 


Resolved.


Follow BMP daily, since he needed Lasix








(11) NIXON (acute kidney injury)


Assessment/Plan: 


Resolved








- Current Meds


Current Meds: 





                               Current Medications











Generic Name Dose Route Start Last Admin





  Trade Name Freq  PRN Reason Stop Dose Admin


 


Acetaminophen  650 mg  12/25/19 17:08  12/31/19 16:28





  Tylenol  PO   650 mg





  Q4HR PRN   Administration





  Pain or Fever > 38C (100.4F)   





     





     





     


 


Cyclobenzaprine HCl  5 mg  12/25/19 19:41  12/25/19 21:37





  Flexeril  PO   5 mg





  TID PRN   Administration





  Spasms   





     





     





     


 


Docusate Sodium  250 - 500 mg  12/26/19 09:00  12/31/19 08:45





  Colace 250mg Capsule  PO   250 mg





  DAILY AMANDA   Administration





     





     





     





     


 


Ferrous Gluconate  324 mg  12/26/19 12:00  12/31/19 08:45





  Fergon  PO   324 mg





  DAILYWM AMANDA   Administration





     





     





     





     


 


Ampicillin Sodium/Sulbactam  100 mls @ 200 mls/hr  12/29/19 12:00  01/01/20 

06:07





  Sodium 3 gm/ Sodium Chloride  IV   Infused





  Q6HR AMANDA   Infusion





     





     





     





     


 


Pantoprazole Sodium  40 mg  12/28/19 21:00  01/01/20 06:06





  Protonix  PO   40 mg





  BIDAC AMANDA   Administration





     





     





     





     


 


Polyethylene Glycol  17 gm  12/26/19 09:00  12/31/19 09:30





  Miralax  PO   17 gm





  DAILY AMANDA   Administration





     





     





     





     


 


Saccharomyces Boulardii  250 mg  12/29/19 17:00  12/31/19 16:29





  Florastor  PO   250 mg





  BIDWM AMANDA   Administration





     





     





     





     


 


Senna  8.6 - 17.2 mg  12/26/19 09:00  12/31/19 08:45





  Senokot  PO   8.6 mg





  DAILY AMANDA   Administration





     





     





     





     


 


Sodium Chloride  10 ml  12/25/19 17:08  12/25/19 18:48





  Normal Saline Flush 0.9%  IVP   10 ml





  PRN PRN   Administration





  AS NEEDED PER PROVIDER ORDERS   





     





     





     


 


Sodium Chloride  10 ml  12/26/19 01:00  01/01/20 01:04





  Normal Saline Flush 0.9%  IVP   10 ml





  0100,0900,1700 AMANDA   Administration





     





     





     





     














- Lab Result


Fish Bone Diagrams: 


                                 01/01/20 05:50





                                 01/01/20 05:50





- Additional Planning


My Orders: 





My Active Orders





01/02/20 05:00


CBC - COMP BLD CT W/AUTO DIFF [HEME] DAILYLAB 


CMP [COMPREHENSIVE METABOLIC PANEL] [CHEM] DAILYLAB 





01/03/20 05:00


CBC - COMP BLD CT W/AUTO DIFF [HEME] DAILYLAB 


CMP [COMPREHENSIVE METABOLIC PANEL] [CHEM] DAILYLAB 





12/31/19 08:45


IS [Incentive Spirometry - RT] [RC] TID 














Objective


Vital Signs: 





                               Vital Signs - 24 hr











  12/31/19 12/31/19 12/31/19





  07:56 09:47 10:20


 


Temperature 37.5 C  


 


Heart Rate [ 80  





Brachial]   


 


Heart Rate [   78





Supine]   


 


Respiratory 16  





Rate   


 


Blood Pressure 142/63 H  





[Right Brachial   





artery]   


 


Blood Pressure   133/61 H





[Supine]   


 


O2 Saturation 93 93 














  12/31/19 12/31/19 12/31/19





  15:38 17:30 23:25


 


Temperature 37.8 C H 36.8 C 37.7 C H


 


Heart Rate [ 80  87





Brachial]   


 


Heart Rate [   





Supine]   


 


Respiratory 18  18





Rate   


 


Blood Pressure 147/61 H  155/64 H





[Right Brachial   





artery]   


 


Blood Pressure   





[Supine]   


 


O2 Saturation 95  80 L














  12/31/19





  23:37


 


Temperature 


 


Heart Rate [ 





Brachial] 


 


Heart Rate [ 





Supine] 


 


Respiratory 





Rate 


 


Blood Pressure 





[Right Brachial 





artery] 


 


Blood Pressure 





[Supine] 


 


O2 Saturation 91 L








                                     Oxygen











O2 Source                      Nasal cannula














I&O (Last 24 Hrs): 





                          Intake and Output Totals x24h











 12/30/19 12/31/19 01/01/20





 23:59 23:59 23:59


 


Intake Total 1014.565 2285 600


 


Output Total 50 50 


 


Balance 2584.422 7183 600











General: Alert, Other (Fatigued and sleeping most of day)


HEENT: Mucous membr. moist/pink


Neck: Supple, No JVD


Neuro: Alert, Non Focal


Cardiovascular: Regular rate, No murmurs


Respiratory: No respiratory distress, Rales


Abdomen: Normal bowel sounds, Soft


Extremities: No edema





- Results


Results: 





                               Laboratory Results











WBC  7.9 x10^3/uL (4.8-10.8)   01/01/20  05:50    


 


RBC  2.15 10^6/uL (4.70-6.10)  L  01/01/20  05:50    


 


Hgb  6.6 g/dL (14.0-18.0)  L*  01/01/20  05:50    


 


Hct  20.9 % (42.0-52.0)  L  01/01/20  05:50    


 


MCV  97.2 fL (80.0-94.0)  H  01/01/20  05:50    


 


MCH  30.7 pg (27.0-31.0)   01/01/20  05:50    


 


MCHC  31.6 g/dL (32.0-36.0)  L  01/01/20  05:50    


 


RDW  13.7 % (12.0-15.0)   01/01/20  05:50    


 


Plt Count  239 10^3/uL (130-450)   01/01/20  05:50    


 


MPV  9.9 fL (7.4-11.4)   01/01/20  05:50    


 


Neut # (Auto)  6.6 10^3/uL (1.5-6.6)   01/01/20  05:50    


 


Lymph # (Auto)  0.7 10^3/uL (1.5-3.5)  L  01/01/20  05:50    


 


Mono # (Auto)  0.5 10^3/uL (0.0-1.0)   01/01/20  05:50    


 


Eos # (Auto)  0.0 10^3/uL (0.0-0.7)   01/01/20  05:50    


 


Baso # (Auto)  0.0 10^3/uL (0.0-0.1)   01/01/20  05:50    


 


Absolute Nucleated RBC  0.00 x10^3/uL  01/01/20  05:50    


 


Band Neuts % (Manual)  Not Reportable   01/01/20  05:50    


 


Abnorm Lymph % (Manual)  Not Reportable   01/01/20  05:50    


 


Nucleated RBC %  0.0 /100WBC  01/01/20  05:50    


 


Neutrophils # (Manual)  Not Reportable   01/01/20  05:50    


 


Lymphocytes # (Manual)  Not Reportable   01/01/20  05:50    


 


Monocytes # (Manual)  Not Reportable   01/01/20  05:50    


 


Eosinophils # (Manual)  Not Reportable   01/01/20  05:50    


 


Basophils # (Manual)  Not Reportable   01/01/20  05:50    


 


Differential Comment  MANUAL=AUTO DIFF   01/01/20  05:50    


 


Platelet Estimate  NORMAL (130-450,000)  (NORMAL)   01/01/20  05:50    


 


RBC Morph Micro Appear  1+ HYPOCHROMASIA  (NORMAL)   01/01/20  05:50    


 


PT  18.7 secs (9.9-12.6)  H  12/29/19  04:25    


 


INR  1.7  (0.8-1.2)  H  12/29/19  04:25    


 


Whole Blood INR  1.5  (0.8-1.2)  H  12/30/19  05:51    


 


Sodium  137 mmol/L (135-145)   01/01/20  05:50    


 


Potassium  3.5 mmol/L (3.5-5.0)   01/01/20  05:50    


 


Chloride  101 mmol/L (101-111)   01/01/20  05:50    


 


Carbon Dioxide  28 mmol/L (21-32)   01/01/20  05:50    


 


Anion Gap  8.0  (6-13)   01/01/20  05:50    


 


BUN  20 mg/dL (6-20)   01/01/20  05:50    


 


Creatinine  0.9 mg/dL (0.6-1.2)   01/01/20  05:50    


 


Estimated GFR (MDRD)  80  (>89)  L  01/01/20  05:50    


 


Glucose  129 mg/dL ()  H  01/01/20  05:50    


 


Lactic Acid  1.4 mmol/L (0.5-2.2)   12/25/19  11:53    


 


Calcium  7.5 mg/dL (8.5-10.3)  L  01/01/20  05:50    


 


Magnesium  1.7 mg/dL (1.7-2.8)   12/26/19  04:25    


 


Iron  15 ug/dL ()  L  12/26/19  04:25    


 


TIBC  148 ug/dL (250-450)  L  12/26/19  04:25    


 


% Saturation  10 % (20-50)  L  12/26/19  04:25    


 


Transferrin  106 mg/dL (180-329)  L  12/26/19  04:25    


 


Total Bilirubin  0.6 mg/dL (0.2-1.0)   01/01/20  05:50    


 


Direct Bilirubin  0.2 mg/dL (0.1-0.5)   12/31/19  05:53    


 


AST  80 IU/L (10-42)  H  01/01/20  05:50    


 


ALT  69 IU/L (10-60)  H  01/01/20  05:50    


 


Alkaline Phosphatase  81 IU/L ()   01/01/20  05:50    


 


Total Protein  5.7 g/dL (6.7-8.2)  L  01/01/20  05:50    


 


Albumin  1.8 g/dL (3.2-5.5)  L  01/01/20  05:50    


 


Globulin  3.9 g/dL (2.1-4.2)   01/01/20  05:50    


 


Albumin/Globulin Ratio  0.5  (1.0-2.2)  L  01/01/20  05:50    


 


Lipase  35 U/L (22-51)   12/25/19  11:53    


 


Vitamin B12  589 pg/mL (180-914)   12/26/19  04:25    


 


Folate  12.69 ng/mL (5.90 - >24.8)   12/26/19  04:25    


 


Urine Color  YELLOW   12/25/19  14:28    


 


Urine Clarity  CLOUDY  (CLEAR)   12/25/19  14:28    


 


Urine pH  5.0 PH (5.0-7.5)   12/25/19  14:28    


 


Ur Specific Gravity  >=1.030  (1.002-1.030)  H  12/25/19  14:28    


 


Urine Protein  100 mg/dL (NEGATIVE)  H  12/25/19  14:28    


 


Urine Glucose (UA)  NEGATIVE mg/dL (NEGATIVE)   12/25/19  14:28    


 


Urine Ketones  NEGATIVE mg/dL (NEGATIVE)   12/25/19  14:28    


 


Urine Occult Blood  SMALL  (NEGATIVE)  H  12/25/19  14:28    


 


Urine Nitrite  NEGATIVE  (NEGATIVE)   12/25/19  14:28    


 


Urine Bilirubin  SMALL  (NEGATIVE)  H  12/25/19  14:28    


 


Urine Urobilinogen  1 (NORMAL) E.U./dL (NORMAL)   12/25/19  14:28    


 


Ur Leukocyte Esterase  SMALL  (NEGATIVE)  H  12/25/19  14:28    


 


Urine RBC  0-5 /HPF (0-5)   12/25/19  14:28    


 


Urine WBC  >25 /HPF (0-3)  H  12/25/19  14:28    


 


Ur Squamous Epith Cells  RARE Squamous  (<= Few)   12/25/19  14:28    


 


Amorphous Sediment  Few /LPF  12/25/19  14:28    


 


Urine Bacteria  Few /HPF (None Seen)   12/25/19  14:28    


 


Urine Casts  0-2 Hyaline Casts /LPF  12/25/19  14:28    


 


Ur Microscopic Review  INDICATED   12/25/19  14:28    


 


Urine Culture Comments  INDICATED   12/25/19  14:28    


 


Influenza A (Rapid)  Negative  (Negative)   12/31/19  16:50    


 


Influenza B (Rapid)  Negative  (Negative)   12/31/19  16:50    


 


Blood Type  A POSITIVE   12/25/19  16:39    


 


Blood Type Recheck  A POSITIVE   12/25/19  11:53    


 


Antibody Screen  NEGATIVE   12/25/19  16:39

## 2020-01-02 VITALS — SYSTOLIC BLOOD PRESSURE: 155 MMHG | DIASTOLIC BLOOD PRESSURE: 65 MMHG

## 2020-01-02 LAB
ALBUMIN DIAFP-MCNC: 1.9 G/DL (ref 3.2–5.5)
ALBUMIN/GLOB SERPL: 0.5 {RATIO} (ref 1–2.2)
ALP SERPL-CCNC: 84 IU/L (ref 42–121)
ALT SERPL W P-5'-P-CCNC: 66 IU/L (ref 10–60)
ANION GAP SERPL CALCULATED.4IONS-SCNC: 11 MMOL/L (ref 6–13)
AST SERPL W P-5'-P-CCNC: 71 IU/L (ref 10–42)
BASOPHILS NFR BLD AUTO: 0 10^3/UL (ref 0–0.1)
BASOPHILS NFR BLD AUTO: 0.3 %
BILIRUB BLD-MCNC: 1.2 MG/DL (ref 0.2–1)
BUN SERPL-MCNC: 20 MG/DL (ref 6–20)
CALCIUM UR-MCNC: 7.6 MG/DL (ref 8.5–10.3)
CHLORIDE SERPL-SCNC: 99 MMOL/L (ref 101–111)
CO2 SERPL-SCNC: 27 MMOL/L (ref 21–32)
CREAT SERPLBLD-SCNC: 0.9 MG/DL (ref 0.6–1.2)
EOSINOPHIL # BLD AUTO: 0 10^3/UL (ref 0–0.7)
EOSINOPHIL NFR BLD AUTO: 0.1 %
ERYTHROCYTE [DISTWIDTH] IN BLOOD BY AUTOMATED COUNT: 14.1 % (ref 12–15)
GFRSERPLBLD MDRD-ARVRAT: 80 ML/MIN/{1.73_M2} (ref 89–?)
GLOBULIN SER-MCNC: 3.9 G/DL (ref 2.1–4.2)
GLUCOSE SERPL-MCNC: 133 MG/DL (ref 70–100)
HGB UR QL STRIP: 8.2 G/DL (ref 14–18)
LYMPHOCYTES # SPEC AUTO: 0.8 10^3/UL (ref 1.5–3.5)
LYMPHOCYTES NFR BLD AUTO: 7.1 %
MCH RBC QN AUTO: 30.7 PG (ref 27–31)
MCHC RBC AUTO-ENTMCNC: 31.5 G/DL (ref 32–36)
MCV RBC AUTO: 97.4 FL (ref 80–94)
MONOCYTES # BLD AUTO: 0.8 10^3/UL (ref 0–1)
MONOCYTES NFR BLD AUTO: 6.6 %
NEUTROPHILS # BLD AUTO: 10 10^3/UL (ref 1.5–6.6)
NEUTROPHILS # SNV AUTO: 11.8 X10^3/UL (ref 4.8–10.8)
NEUTROPHILS NFR BLD AUTO: 85.1 %
PDW BLD AUTO: 11.3 FL (ref 7.4–11.4)
PLATELET # BLD: 189 10^3/UL (ref 130–450)
PLATELET BLD QL SMEAR: (no result)
PROT SPEC-MCNC: 5.8 G/DL (ref 6.7–8.2)
RBC MAR: 2.67 10^6/UL (ref 4.7–6.1)
SODIUM SERPLBLD-SCNC: 137 MMOL/L (ref 135–145)

## 2020-01-02 RX ADMIN — SODIUM CHLORIDE, PRESERVATIVE FREE PRN ML: 5 INJECTION INTRAVENOUS at 00:53

## 2020-01-02 RX ADMIN — SODIUM CHLORIDE SCH MLS/HR: 9 INJECTION, SOLUTION INTRAVENOUS at 00:23

## 2020-01-02 RX ADMIN — SODIUM CHLORIDE, PRESERVATIVE FREE SCH ML: 5 INJECTION INTRAVENOUS at 00:20

## 2020-01-02 RX ADMIN — ACETAMINOPHEN PRN MG: 325 TABLET ORAL at 00:23

## 2020-01-02 NOTE — DISCHARGE SUMMARY
Discharge Summary


Admit Date: 12/25/19


Discharge Date: 01/02/20


Discharging Provider: Johnny Barnett


Primary Care Provider: Zeke Echols


Code Status: Attempt Resuscitation


Condition at Discharge: Stable


Discharge Disposition: 02 Transfer Acute Care Hosp


Discharge Facility Name: [facility]





- DIAGNOSES


Admission Diagnoses: 


UTI


Anemia


GI bleed


Hypertension


Hypokalemia


Acute kidney injury


Bladder cancer


Discharge Diagnoses with Status of Each Condition: 


Fever - He has been spiking daily fevers except on December 31st. His highest 

temperature has been 39 C which occurred on January 1 at 4 PM.  This did occur 

approximately 3 hours after receiving a blood transfusion for his anemia.  He 

did spike another temperature prior to transfer to [facility] at 

midnight on January 2 with a temperature of 38.6C.  He had a chest x-ray the 

morning of the first which did not show an infiltrate but did show pulmonary 

vascular congestion.  A CT of the abdomen and pelvis with contrast of days ago 

had enlarged and lymph nodes but no abscesses.  The gallbladder appeared 

unremarkable. Influenza was checked and this was negative. The daytime provider 

spoke with 2 different infectious disease specialists over the past few days to 

discuss the case.  Given that the patient has remained febrile without a clear 

source except for the initial urinary tract infection, he will be transferred to

[facility] for further evaluation as he requires infectious disease and 

oncology input. He was graciously accepted by Dr. Cid of the hospitalist 

service.





Urinary tract infection - Initial cultures before hospitalization grew 

enterococcus and Aerococcus but the Aerococcus was not sensitive to c

iprofloxacin.  He was started on ceftriaxone initially but he continued to 

become febrile with an increasing white count.  He was then transitioned to 

Unasyn on December 29th and his white count has since resolved.  Unfortunately 

he remains persistently febrile.  Repeat urine cultures during this ho

spitalization have been negative. His initial urinalysis did reveal small 

leukocyte esterase and over 25 WBCs but few bacteria. Blood cultures have also 

been negative.





Hypoxia - He is requiring 2 to 3 L of oxygen via nasal cannula for the past few 

days.  A repeat chest x-ray the morning of January 1 revealed pulmonary vascular

congestion. BNP was checked and it was elevated at 218. Unfortunately, we did 

not have echocardiogram available over the past few days but he was scheduled to

get one the day of transfer. He has since been started on IV Lasix and his IV 

fluids have been discontinued.





Elevated LFTs - His LFTs are on admission with an AST of 52 and ALT of 41.  They

have slightly increased since hospitalization and AST is now 80 and ALT is 69.  

Total bilirubin is 0.6 and alkaline phosphatase is 81. His initial LFT elevation

was thought to be secondary to alcohol use he drinks 1-2 drinks a day. The 

elevated now is felt to be secondary to passive liver congestion given the 

amount of IV fluids he initially received during the hospitalization.





Anemia - Presented with a hemoglobin of 7.7 and prior labs revealed a baseline 

around 12-13.  His stool occult was positive for blood.  He underwent an EGD and

colonoscopy which did not reveal an active source of bleeding.  His hemoglobin 

has slowly trended down throughout this hospitalization and on January 1 it 

dropped to 6.6.  He was transfused 1 unit of packed red blood cells with 

improvement in his hemoglobin to 7.1.  He has no evidence of blood in his stool 

at this time.  He is on oral iron supplementation.





GI bleed - He had heme positive stool on admission.  Colonoscopy showed 4 polyps

which were resected but no active bleeding was evident and no obvious source of 

major bleeding.  Endoscopy showed a mass measuring 3 mm in size located in the 

first part of the duodenum.  There was no evidence of active bleeding.





Elevated INR - His INR was elevated at 1.8 on admission and it was last checked 

on the 30th December and it had decreased to 1.5. This was attributed to 

abnormal liver function.  CT the abdomen and pelvis showed a fatty liver but 

there were no masses or cysts.





Hypertension - He is on hydrochlorothiazide at home and he has been becoming 

more hypertensive.  He was started on amlodipine 2.5 mg daily in addition to the

IV Lasix he is receiving for the pulmonary vascular congestion. 





Bladder cancer - Follows with  at [facility].  Pathology had revealed urothelial cancer which was high-grade.








- HPI


History of Present Illness: 


H&P per Dr. Bravo on 12/15/19:





Patient is an 87 y/o male who presented to the ED with complain of not feeling 

well for the past 3-4 weeks. He was initially seen in the ED in the beginning of

November for removal of a cruz catheter. He returned 1 week ago because he was 

still not feeling well. He was found to have a UTI and given a 5-day course of 

Cipro. He was also advised to return if there was no improvement thus leading to

his return. On further review it was found that 2 bacteria grew from the urine 

culture E. faecalis and Aerococcus urinae. Aerococcus was not sensitive to 

Cipro. He was also found to have a hemoglobin of 7.7. On October 29th 2019, it 

was 12.6. He denies dark tarry stools, coffee ground emesis or any traumatic 

event from which he would have bled profusely. He last had a colonoscopy 8-9 yrs

ago, during which a polyp was found. He denied chest pain, dyspnea, abd pain, 

n/v/d. He reports back spasm.


He has been diagnosed with bladder cancer and is schedule to follow up with 

[facility] for further treatment.








- CONSULTS | PROCEDURES


Consultations: General Surgery


Procedures: 


EGD and Colonoscopy.








- HOSPITAL COURSE


Hospital Course: 


Please see discharge diagnoses above.








- ALLERGIES


Allergies/Adverse Reactions: 


                                    Allergies











Allergy/AdvReac Type Severity Reaction Status Date / Time


 


No Known Drug Allergies Allergy   Verified 12/19/19 13:29














- MEDICATIONS


Home Medications: 


                                Ambulatory Orders











 Medication  Instructions  Recorded  Confirmed


 


Hydrochlorothiazide 12.5 mg PO DAILY 03/04/18 12/27/19


 


Omeprazole 20 mg PO DAILY 03/04/18 12/27/19














- PHYSICAL EXAM AT DISCHARGE


General Appearance: positive: No acute distress, Alert


Eyes Bilateral: positive: Normal inspection


ENT: positive: ENT inspection nml, Other (Nasal cannula in place.)


Neck: positive: Nml inspection


Respiratory: positive: No respiratory distress, Other (Diminished breath sounds 

with rales present.)


Cardiovascular: positive: Regular rate & rhythm, No murmur.  negative: 

Tachycardia, Bradycardia, Systolic murmur, Diastolic murmur


Abdomen: positive: Non-tender, No distention.  negative: Tenderness, Guarding, 

Rebound


Skin: positive: No rash, Warm, Dry


Extremities: positive: Full ROM, No pedal edema


Neurologic/Psychiatric: positive: Oriented x3.  negative: Disoriented to person,

Disoriented to place, Disoriented to time, Weakness





- LABS


Result Diagrams: 


                                 01/02/20 04:45





                                 01/02/20 04:45





- DIAGNOSTIC IMAGING


Diagnostic Imaging Results: Final report reviewed


Diagnostic Imaging Results Comments: 





Active Medications





Acetaminophen (Tylenol)  650 mg PO Q4HR PRN


   PRN Reason: Pain or Fever > 38C (100.4F)


   Last Admin: 01/02/20 00:23 Dose:  650 mg


Amlodipine Besylate (Norvasc)  2.5 mg PO DAILY [facility]


Cyclobenzaprine HCl (Flexeril)  5 mg PO TID PRN


   PRN Reason: Spasms


   Last Admin: 12/25/19 21:37 Dose:  5 mg


Docusate Sodium (Colace 250mg Capsule)  250 - 500 mg PO DAILY [facility]


   Last Admin: 01/01/20 09:18 Dose:  250 mg


Ferrous Gluconate (Fergon)  324 mg PO DAILYWM [facility]


   Last Admin: 01/01/20 09:18 Dose:  324 mg


Furosemide (Lasix Inj 20mg Vial)  20 mg IVP ONCE PRN


   PRN Reason: AS NEEDED PER PROVIDER ORDERS


   Stop: 01/02/20 07:59


Hydralazine HCl (Apresoline Inj)  10 mg IVP Q6H PRN


   PRN Reason: PER PHYSICIAN ORDER


Ampicillin Sodium/Sulbactam (Sodium 3 gm/ Sodium Chloride)  100 mls @ 200 mls/hr

IV Q6HR [facility]


   Last Infusion: 01/02/20 01:21 Dose:  Infused


Ondansetron HCl (Zofran Inj)  4 mg IVP Q6HR PRN


   PRN Reason: Nausea / Vomiting


Pantoprazole Sodium (Protonix)  40 mg PO BIDAC [facility]


   Last Admin: 01/01/20 16:55 Dose:  40 mg


Polyethylene Glycol (Miralax)  17 gm PO DAILY [facility]


   Last Admin: 01/01/20 09:18 Dose:  Not Given


Saccharomyces Boulardii (Florastor)  250 mg PO BIDWM [facility]


   Last Admin: 01/01/20 16:55 Dose:  250 mg


Senna (Senokot)  8.6 - 17.2 mg PO DAILY [facility]


   Last Admin: 01/01/20 09:18 Dose:  8.6 mg


Sodium Chloride (Normal Saline Flush 0.9%)  10 ml IVP PRN PRN


   PRN Reason: AS NEEDED PER PROVIDER ORDERS


   Last Admin: 01/02/20 00:53 Dose:  10 ml


Sodium Chloride (Normal Saline Flush 0.9%)  10 ml IVP 0100,0900,1700 [facility]


   Last Admin: 01/02/20 00:20 Dose:  10 ml





                                        





Hydrochlorothiazide 12.5 mg PO DAILY 03/04/18 


Omeprazole 20 mg PO DAILY 03/04/18 














                                  Microbiology





12/26/19 08:30   Blood - Left Arm   Blood Culture - Final


                            NO GROWTH AFTER 5 DAYS


12/25/19 12:48   Blood   Blood Culture - Final


                            NO GROWTH AFTER 5 DAYS


12/25/19 11:53   Blood   Blood Culture - Final


                            NO GROWTH AFTER 5 DAYS


12/25/19 14:28   Urine,Clean Catch   Urine Culture - Final


                            NO AEROBIC GROWTH AT 24 HOURS


12/25/19 15:54   Stool   Occult Blood - Final

















                               Lab Results x24hrs











  01/01/20 01/01/20 01/01/20





  19:16 18:56 07:32


 


WBC      





    


 


RBC      





    


 


Hgb    7.1 g/dL L g/dL  





    (14.0-18.0)  


 


Hct    22.3 % L %  





    (42.0-52.0)  


 


MCV      





    


 


MCH      





    


 


MCHC      





    


 


RDW      





    


 


Plt Count      





    


 


MPV      





    


 


Neut # (Auto)      





    


 


Lymph # (Auto)      





    


 


Mono # (Auto)      





    


 


Eos # (Auto)      





    


 


Baso # (Auto)      





    


 


Absolute Nucleated RBC      





    


 


Band Neuts % (Manual)      





    


 


Abnorm Lymph % (Manual)      





    


 


Nucleated RBC %      





    


 


Neutrophils # (Manual)      





    


 


Lymphocytes # (Manual)      





    


 


Monocytes # (Manual)      





    


 


Eosinophils # (Manual)      





    


 


Basophils # (Manual)      





    


 


Differential Comment      





    


 


Platelet Estimate      





    


 


RBC Morph Micro Appear      





    


 


Sodium      





    


 


Potassium      





    


 


Chloride      





    


 


Carbon Dioxide      





    


 


Anion Gap      





    


 


BUN      





    


 


Creatinine      





    


 


Estimated GFR (MDRD)      





    


 


Glucose      





    


 


Lactic Acid  1.2 mmol/L mmol/L    





   (0.5-2.2)   


 


Calcium      





    


 


Total Bilirubin      





    


 


AST      





    


 


ALT      





    


 


Alkaline Phosphatase      





    


 


B-Natriuretic Peptide      





    


 


Total Protein      





    


 


Albumin      





    


 


Globulin      





    


 


Albumin/Globulin Ratio      





    


 


Blood Type      A POSITIVE 





    


 


Antibody Screen      NEGATIVE 





    


 


Crossmatch IS Only      See Detail 





    














  01/01/20 01/01/20 01/01/20





  05:50 05:50 05:50


 


WBC    7.9 x10^3/uL x10^3/uL  





    (4.8-10.8)  


 


RBC    2.15 10^6/uL L 10^6/uL  





    (4.70-6.10)  


 


Hgb    6.6 g/dL L* g/dL  





    (14.0-18.0)  


 


Hct    20.9 % L %  





    (42.0-52.0)  


 


MCV    97.2 fL H fL  





    (80.0-94.0)  


 


MCH    30.7 pg pg  





    (27.0-31.0)  


 


MCHC    31.6 g/dL L g/dL  





    (32.0-36.0)  


 


RDW    13.7 % %  





    (12.0-15.0)  


 


Plt Count    239 10^3/uL 10^3/uL  





    (130-450)  


 


MPV    9.9 fL fL  





    (7.4-11.4)  


 


Neut # (Auto)    6.6 10^3/uL 10^3/uL  





    (1.5-6.6)  


 


Lymph # (Auto)    0.7 10^3/uL L 10^3/uL  





    (1.5-3.5)  


 


Mono # (Auto)    0.5 10^3/uL 10^3/uL  





    (0.0-1.0)  


 


Eos # (Auto)    0.0 10^3/uL 10^3/uL  





    (0.0-0.7)  


 


Baso # (Auto)    0.0 10^3/uL 10^3/uL  





    (0.0-0.1)  


 


Absolute Nucleated RBC    0.00 x10^3/uL x10^3/uL  





    


 


Band Neuts % (Manual)    Not Reportable   





    


 


Abnorm Lymph % (Manual)    Not Reportable   





    


 


Nucleated RBC %    0.0 /100WBC /100WBC  





    


 


Neutrophils # (Manual)    Not Reportable   





    


 


Lymphocytes # (Manual)    Not Reportable   





    


 


Monocytes # (Manual)    Not Reportable   





    


 


Eosinophils # (Manual)    Not Reportable   





    


 


Basophils # (Manual)    Not Reportable   





    


 


Differential Comment    MANUAL=AUTO DIFF   





    


 


Platelet Estimate    NORMAL (130-450,000)   





    (NORMAL)  


 


RBC Morph Micro Appear    1+ HYPOCHROMASIA   





    (NORMAL)  


 


Sodium      137 mmol/L mmol/L





     (135-145) 


 


Potassium      3.5 mmol/L mmol/L





     (3.5-5.0) 


 


Chloride      101 mmol/L mmol/L





     (101-111) 


 


Carbon Dioxide      28 mmol/L mmol/L





     (21-32) 


 


Anion Gap      8.0 





     (6-13) 


 


BUN      20 mg/dL mg/dL





     (6-20) 


 


Creatinine      0.9 mg/dL mg/dL





     (0.6-1.2) 


 


Estimated GFR (MDRD)      80  L 





     (>89) 


 


Glucose      129 mg/dL H mg/dL





     () 


 


Lactic Acid      





    


 


Calcium      7.5 mg/dL L mg/dL





     (8.5-10.3) 


 


Total Bilirubin      0.6 mg/dL mg/dL





     (0.2-1.0) 


 


AST      80 IU/L H IU/L





     (10-42) 


 


ALT      69 IU/L H IU/L





     (10-60) 


 


Alkaline Phosphatase      81 IU/L IU/L





     () 


 


B-Natriuretic Peptide  218 pg/mL H pg/mL    





   (5-100)   


 


Total Protein      5.7 g/dL L g/dL





     (6.7-8.2) 


 


Albumin      1.8 g/dL L g/dL





     (3.2-5.5) 


 


Globulin      3.9 g/dL g/dL





     (2.1-4.2) 


 


Albumin/Globulin Ratio      0.5  L 





     (1.0-2.2) 


 


Blood Type      





    


 


Antibody Screen      





    


 


Crossmatch IS Only      





    














- TIME SPENT


Time Spent in Discharge (Minutes): 45